# Patient Record
Sex: FEMALE | Race: OTHER
[De-identification: names, ages, dates, MRNs, and addresses within clinical notes are randomized per-mention and may not be internally consistent; named-entity substitution may affect disease eponyms.]

---

## 2019-11-02 ENCOUNTER — HOSPITAL ENCOUNTER (INPATIENT)
Dept: HOSPITAL 72 - EMR | Age: 44
LOS: 5 days | Discharge: HOME | DRG: 263 | End: 2019-11-07
Payer: MEDICAID

## 2019-11-02 VITALS — HEIGHT: 60 IN | WEIGHT: 237.8 LBS | BODY MASS INDEX: 46.68 KG/M2

## 2019-11-02 VITALS — SYSTOLIC BLOOD PRESSURE: 110 MMHG | DIASTOLIC BLOOD PRESSURE: 60 MMHG

## 2019-11-02 VITALS — SYSTOLIC BLOOD PRESSURE: 125 MMHG | DIASTOLIC BLOOD PRESSURE: 86 MMHG

## 2019-11-02 VITALS — SYSTOLIC BLOOD PRESSURE: 130 MMHG | DIASTOLIC BLOOD PRESSURE: 89 MMHG

## 2019-11-02 VITALS — DIASTOLIC BLOOD PRESSURE: 82 MMHG | SYSTOLIC BLOOD PRESSURE: 135 MMHG

## 2019-11-02 VITALS — DIASTOLIC BLOOD PRESSURE: 83 MMHG | SYSTOLIC BLOOD PRESSURE: 137 MMHG

## 2019-11-02 DIAGNOSIS — R07.89: ICD-10-CM

## 2019-11-02 DIAGNOSIS — Z90.12: ICD-10-CM

## 2019-11-02 DIAGNOSIS — E87.6: ICD-10-CM

## 2019-11-02 DIAGNOSIS — E66.9: ICD-10-CM

## 2019-11-02 DIAGNOSIS — K80.00: Primary | ICD-10-CM

## 2019-11-02 LAB
ADD MANUAL DIFF: NO
ALBUMIN SERPL-MCNC: 3.9 G/DL (ref 3.4–5)
ALBUMIN/GLOB SERPL: 1 {RATIO} (ref 1–2.7)
ALP SERPL-CCNC: 81 U/L (ref 46–116)
ALT SERPL-CCNC: 36 U/L (ref 12–78)
ANION GAP SERPL CALC-SCNC: 10 MMOL/L (ref 5–15)
APPEARANCE UR: CLEAR
APTT BLD: 28 SEC (ref 23–33)
APTT PPP: (no result) S
AST SERPL-CCNC: 23 U/L (ref 15–37)
BASOPHILS NFR BLD AUTO: 0.6 % (ref 0–2)
BILIRUB SERPL-MCNC: 0.3 MG/DL (ref 0.2–1)
BUN SERPL-MCNC: 9 MG/DL (ref 7–18)
CALCIUM SERPL-MCNC: 8.9 MG/DL (ref 8.5–10.1)
CHLORIDE SERPL-SCNC: 103 MMOL/L (ref 98–107)
CO2 SERPL-SCNC: 24 MMOL/L (ref 21–32)
CREAT SERPL-MCNC: 0.8 MG/DL (ref 0.55–1.3)
EOSINOPHIL NFR BLD AUTO: 0.1 % (ref 0–3)
ERYTHROCYTE [DISTWIDTH] IN BLOOD BY AUTOMATED COUNT: 11 % (ref 11.6–14.8)
GLOBULIN SER-MCNC: 3.9 G/DL
GLUCOSE UR STRIP-MCNC: NEGATIVE MG/DL
HCT VFR BLD CALC: 43.4 % (ref 37–47)
HGB BLD-MCNC: 14.4 G/DL (ref 12–16)
INR PPP: 0.9 (ref 0.9–1.1)
KETONES UR QL STRIP: (no result)
LEUKOCYTE ESTERASE UR QL STRIP: (no result)
LYMPHOCYTES NFR BLD AUTO: 17.2 % (ref 20–45)
MCV RBC AUTO: 85 FL (ref 80–99)
MONOCYTES NFR BLD AUTO: 2.4 % (ref 1–10)
NEUTROPHILS NFR BLD AUTO: 79.7 % (ref 45–75)
NITRITE UR QL STRIP: NEGATIVE
PH UR STRIP: 5 [PH] (ref 4.5–8)
PLATELET # BLD: 278 K/UL (ref 150–450)
POTASSIUM SERPL-SCNC: 3.5 MMOL/L (ref 3.5–5.1)
PROT UR QL STRIP: (no result)
RBC # BLD AUTO: 5.1 M/UL (ref 4.2–5.4)
SODIUM SERPL-SCNC: 137 MMOL/L (ref 136–145)
SP GR UR STRIP: 1.02 (ref 1–1.03)
UROBILINOGEN UR-MCNC: NORMAL MG/DL (ref 0–1)
WBC # BLD AUTO: 9.9 K/UL (ref 4.8–10.8)

## 2019-11-02 PROCEDURE — 83690 ASSAY OF LIPASE: CPT

## 2019-11-02 PROCEDURE — 80307 DRUG TEST PRSMV CHEM ANLYZR: CPT

## 2019-11-02 PROCEDURE — 81025 URINE PREGNANCY TEST: CPT

## 2019-11-02 PROCEDURE — 85610 PROTHROMBIN TIME: CPT

## 2019-11-02 PROCEDURE — 78266 GSTR EMPT IMG SM BWL&COLON: CPT

## 2019-11-02 PROCEDURE — 36415 COLL VENOUS BLD VENIPUNCTURE: CPT

## 2019-11-02 PROCEDURE — 86140 C-REACTIVE PROTEIN: CPT

## 2019-11-02 PROCEDURE — 99285 EMERGENCY DEPT VISIT HI MDM: CPT

## 2019-11-02 PROCEDURE — 85730 THROMBOPLASTIN TIME PARTIAL: CPT

## 2019-11-02 PROCEDURE — 87040 BLOOD CULTURE FOR BACTERIA: CPT

## 2019-11-02 PROCEDURE — 84484 ASSAY OF TROPONIN QUANT: CPT

## 2019-11-02 PROCEDURE — 96366 THER/PROPH/DIAG IV INF ADDON: CPT

## 2019-11-02 PROCEDURE — 80048 BASIC METABOLIC PNL TOTAL CA: CPT

## 2019-11-02 PROCEDURE — 96365 THER/PROPH/DIAG IV INF INIT: CPT

## 2019-11-02 PROCEDURE — 85651 RBC SED RATE NONAUTOMATED: CPT

## 2019-11-02 PROCEDURE — 80053 COMPREHEN METABOLIC PANEL: CPT

## 2019-11-02 PROCEDURE — 94003 VENT MGMT INPAT SUBQ DAY: CPT

## 2019-11-02 PROCEDURE — 85025 COMPLETE CBC W/AUTO DIFF WBC: CPT

## 2019-11-02 PROCEDURE — 76700 US EXAM ABDOM COMPLETE: CPT

## 2019-11-02 PROCEDURE — 96376 TX/PRO/DX INJ SAME DRUG ADON: CPT

## 2019-11-02 PROCEDURE — 94150 VITAL CAPACITY TEST: CPT

## 2019-11-02 PROCEDURE — 81003 URINALYSIS AUTO W/O SCOPE: CPT

## 2019-11-02 PROCEDURE — 96375 TX/PRO/DX INJ NEW DRUG ADDON: CPT

## 2019-11-02 PROCEDURE — 82150 ASSAY OF AMYLASE: CPT

## 2019-11-02 RX ADMIN — DOCUSATE SODIUM SCH MG: 100 CAPSULE, LIQUID FILLED ORAL at 21:00

## 2019-11-02 RX ADMIN — SODIUM CHLORIDE SCH MLS/HR: 0.9 INJECTION INTRAVENOUS at 17:59

## 2019-11-02 RX ADMIN — MORPHINE SULFATE PRN MG: 2 INJECTION, SOLUTION INTRAMUSCULAR; INTRAVENOUS at 17:59

## 2019-11-02 NOTE — DIAGNOSTIC IMAGING REPORT
EXAM:

  US Abdomen Complete

 

CLINICAL HISTORY:

  PAIN

 

TECHNIQUE:

  Real-time ultrasound of the abdomen (complete) with image documentation.

 

COMPARISON:

  No relevant prior studies available.

 

FINDINGS:

  Liver:  Liver diameter 17.82 cm. Diffusely increased echotexture.  No 

visible parenchymal lesions.  No intrahepatic biliary ductal dilatation.

  Gallbladder: Cholelithiasis with stones measuring up to 2.7 cm, and a 

stone lodged in the gallbladder neck.  No wall thickening.  No 

pericholecystic fluid.

  Common bile duct:  Common bile duct diameter 0.47 mm, within normal 

limits.

  Pancreas:  Unremarkable as visualized. Pancreatic body and tail are 

obscured by bowel gas.

  Kidneys:  Right kidney length of 11.52 cm.  Left kidney length of 10.95 

cm. Normal cortical thickness.  No visible stones.  No hydronephrosis.  1.

2 cm simple-appearing cyst in the left lower renal pole.

  Spleen:  Spleen length of 8.36 cm, within normal limits.

  Aorta:  Unremarkable.  Visualized portions appear unremarkable without 

evidence of aneurysm.

  Inferior vena cava:  Unremarkable.

 

IMPRESSION:   

1.  Cholelithiasis with stones measuring up to 2.7 cm and the stone 

lodged in the gallbladder neck.  No gallbladder wall thickening or 

pericholecystic fluid.  No blurry ductal dilatation.

2.  1.2 cm simple-appearing cortical cyst in the left lower renal pole.

3.  Diffusely echogenic liver, suggesting fatty infiltration.

## 2019-11-02 NOTE — NUR
ED Nurse Note:

Patient walked into ED c/o upper abdominal pain that started earlier this 
morning at around 0300, patient reports a sudden acute pain that woke her up, 
describes it as epigastric with a rate of 10/10 pain. denies any episodes of 
vomit however states that she is nauseous, IV started on left AC 20 gauge, 
placed on a cardiac monitor. labs sent down. will continue to monitor

## 2019-11-02 NOTE — HISTORY AND PHYSICAL
History of Present Illness


General


Date patient seen:  Nov 2, 2019


Reason for Hospitalization:  Abdominal Pain





Present Illness


HPI


This is a 44-year-old female with a past medical history of obesity presenting 

with right upper quadrant, constant, sharp, 8 out of 10, radiating to her back 

abdominal pain since 3 AM this morning.  The patient had a similar episode 2 

years ago and it resolved on its own.  She has some nausea but no vomiting.  

Denies any fever or chills chest pain shortness of breath cough.





In the ER the patient had a temperature 97.5 pulse 80 respirations 18 pressure 

125/86 saturating 100% on room air.  WBCs 9.9 lipase 222 CMP and CBC normal.  

INR 0.9 UA negative, UDS negative.  Right upper quadrant ultrasound showed 

cholelithiasis





Allergies: None





Medications B12





Past medical history none





Surgical history left breast mass excision





Family history mother breast cancer at 85


Allergies:  


Coded Allergies:  


     No Known Allergies (Unverified , 11/2/19)





Medication History


No Active Prescriptions or Reported Meds





Patient History


Healthcare decision maker





Resuscitation status


Full Code


Advanced Directive on File








Review of Systems


Constitutional:  Denies: see HPI, chills, sweats, fever, malaise, weakness, 

other


Eye:  Denies: see HPI, eye pain, blurred vision, tearing, double vision, nose 

pain, nose congestion, acuity changes, discharge, other


ENT:  Denies: see HPI, ear pain, ear discharge, nose pain, nose congestion, 

throat pain, throat swelling, mouth pain, hearing loss, nasal discharge, other


Respiratory:  Denies: see HPI, cough, orthopnea, shortness of breath, stridor, 

wheezing, AQUINO, sputum, other


Cardiovascular:  Denies: see HPI, chest pain, edema, palpitations, syncope, PND

, other


Gastrointestinal:  Reports: abdominal pain, nausea; Denies: see HPI, 

constipation, diarrhea, vomiting, melena, hematemesis, other


Genitourinary:  Denies: see HPI, discharge, dysuria, frequency, hematuria, pain

, retention, incontinence, urgency, vag bleed/dc, other


Musculoskeletal:  Denies: see HPI, back pain, gout, joint pain, joint swelling, 

muscle pain, muscle stiffness, other


Skin:  Denies: see HPI, rash, change in color, change in hair/nails, dryness, 

lesions, other


Psychiatric:  Denies: see HPI, prior hx, anxiety, depressed feelings, emotional 

problems, SI, HI, hallucinations, other


Neurological:  Denies: see HPI, headache, numbness, paresthesia, seizure, 

tingling, tremors, focal weakness, syncope, dizziness, other


Endocrine:  Denies: see HPI, excessive sweating, flushing, intolerance to 

temperature, increased thirst, increased urine, unexplained weight loss, other


Hematologic/Lymphatic:  Denies: see HPI, anemia, blood clots, easy bleeding, 

easy bruising, swollen glands, diathesis, other





Physical Exam


General Appearance:  WD/WN, no apparent distress, alert


Lines, tubes and drains:  peripheral


HEENT:  normocephalic, atraumatic


Neck:  non-tender, normal alignment, supple


Respiratory/Chest:  chest wall non-tender, lungs clear, normal breath sounds


Cardiovascular/Chest:  normal peripheral pulses, normal rate, regular rhythm, 

no JVD


Abdomen:  other - Soft, nondistended, bowel sounds present, tenderness to 

palpation of epigastric and right upper quadrant with positive Tesfaye sign


Extremities:  normal range of motion, non-tender


Skin Exam:  normal pigmentation, warm/dry


Neurologic:  CNs II-XII grossly normal, no motor/sensory deficits, alert, 

oriented x 3


Lymphatic:  anterior cervical - No lymphadenopathy





Last 24 Hour Vital Signs








  Date Time  Temp Pulse Resp B/P (MAP) Pulse Ox O2 Delivery O2 Flow Rate FiO2


 


11/2/19 18:29 98.1       


 


11/2/19 16:13      Room Air  


 


11/2/19 16:00 98.5 83 20 127/70 98 Room Air  


 


11/2/19 16:00 98.1 88 18 130/89 (103) 98   


 


11/2/19 15:40 98.2 83 18 110/60 100 Room Air  


 


11/2/19 13:25 97.5 86 18 135/82 100 Room Air  


 


11/2/19 11:05 97.5       


 


11/2/19 10:20  80 18   Room Air  


 


11/2/19 10:20 97.5 86 18 125/86 100 Room Air  


 


11/2/19 10:15 97.5 80 18 125/86 (99) 100 Room Air  











Laboratory Tests








Test


  11/2/19


10:20


 


White Blood Count


  9.9 K/UL


(4.8-10.8)


 


Red Blood Count


  5.10 M/UL


(4.20-5.40)


 


Hemoglobin


  14.4 G/DL


(12.0-16.0)


 


Hematocrit


  43.4 %


(37.0-47.0)


 


Mean Corpuscular Volume 85 FL (80-99)  


 


Mean Corpuscular Hemoglobin


  28.2 PG


(27.0-31.0)


 


Mean Corpuscular Hemoglobin


Concent 33.1 G/DL


(32.0-36.0)


 


Red Cell Distribution Width


  11.0 %


(11.6-14.8)  L


 


Platelet Count


  278 K/UL


(150-450)


 


Mean Platelet Volume


  6.8 FL


(6.5-10.1)


 


Neutrophils (%) (Auto)


  79.7 %


(45.0-75.0)  H


 


Lymphocytes (%) (Auto)


  17.2 %


(20.0-45.0)  L


 


Monocytes (%) (Auto)


  2.4 %


(1.0-10.0)


 


Eosinophils (%) (Auto)


  0.1 %


(0.0-3.0)


 


Basophils (%) (Auto)


  0.6 %


(0.0-2.0)


 


Prothrombin Time


  9.5 SEC


(9.30-11.50)


 


Prothromb Time International


Ratio 0.9 (0.9-1.1)  


 


 


Activated Partial


Thromboplast Time 28 SEC (23-33)


 


 


Urine Color Pale yellow  


 


Urine Appearance Clear  


 


Urine pH 5 (4.5-8.0)  


 


Urine Specific Gravity


  1.020


(1.005-1.035)


 


Urine Protein


  1+ (NEGATIVE)


H


 


Urine Glucose (UA)


  Negative


(NEGATIVE)


 


Urine Ketones


  2+ (NEGATIVE)


H


 


Urine Blood


  2+ (NEGATIVE)


H


 


Urine Nitrite


  Negative


(NEGATIVE)


 


Urine Bilirubin


  Negative


(NEGATIVE)


 


Urine Urobilinogen


  Normal MG/DL


(0.0-1.0)


 


Urine Leukocyte Esterase


  2+ (NEGATIVE)


H


 


Urine RBC


  0-2 /HPF (0 -


2)


 


Urine WBC


  2-4 /HPF (0 -


2)


 


Urine Squamous Epithelial


Cells Moderate /LPF


(NONE/OCC)  H


 


Urine Bacteria


  Few /HPF


(NONE)


 


Urine Mucus


  Few /LPF


(NONE/OCC)  H


 


Urine HCG, Qualitative


  Negative


(NEGATIVE)


 


Sodium Level


  137 MMOL/L


(136-145)


 


Potassium Level


  3.5 MMOL/L


(3.5-5.1)


 


Chloride Level


  103 MMOL/L


()


 


Carbon Dioxide Level


  24 MMOL/L


(21-32)


 


Anion Gap


  10 mmol/L


(5-15)


 


Blood Urea Nitrogen


  9 mg/dL (7-18)


 


 


Creatinine


  0.8 MG/DL


(0.55-1.30)


 


Estimat Glomerular Filtration


Rate > 60 mL/min


(>60)


 


Glucose Level


  122 MG/DL


()  H


 


Calcium Level


  8.9 MG/DL


(8.5-10.1)


 


Total Bilirubin


  0.3 MG/DL


(0.2-1.0)


 


Aspartate Amino Transf


(AST/SGOT) 23 U/L (15-37)


 


 


Alanine Aminotransferase


(ALT/SGPT) 36 U/L (12-78)


 


 


Alkaline Phosphatase


  81 U/L


()


 


Total Protein


  7.8 G/DL


(6.4-8.2)


 


Albumin


  3.9 G/DL


(3.4-5.0)


 


Globulin 3.9 g/dL  


 


Albumin/Globulin Ratio 1.0 (1.0-2.7)  


 


Lipase


  222 U/L


()


 


Urine Opiates Screen


  Negative


(NEGATIVE)


 


Urine Barbiturates Screen


  Negative


(NEGATIVE)


 


Phencyclidine (PCP) Screen


  Negative


(NEGATIVE)


 


Urine Amphetamines Screen


  Negative


(NEGATIVE)


 


Urine Benzodiazepines Screen


  Negative


(NEGATIVE)


 


Urine Cocaine Screen


  Negative


(NEGATIVE)


 


Urine Marijuana (THC) Screen


  Negative


(NEGATIVE)








Height (Feet):  5


Height (Inches):  1.00


Weight (Pounds):  224


Medications





Current Medications








 Medications


  (Trade)  Dose


 Ordered  Sig/Danita


 Route


 PRN Reason  Start Time


 Stop Time Status Last Admin


Dose Admin


 


 Acetaminophen


  (Tylenol)  650 mg  Q4H  PRN


 ORAL


 fever (temp>100.4F)  11/2/19 15:15


 12/2/19 15:14   


 


 


 Al Hydroxide/Mg


 Hydroxide


  (Mylanta II)  30 ml  Q6H  PRN


 ORAL


 dyspepsia  11/2/19 15:15


 12/2/19 15:14   


 


 


 Bisacodyl


  (Dulcolax)  10 mg  HSPRN  PRN


 RECTAL


 Constipation  11/2/19 15:15


 12/2/19 15:14   


 


 


 Ceftriaxone


 Sodium 1 gm/


 Sodium Chloride  55 ml @ 


 110 mls/hr  DAILY


 IVPB


   11/2/19 18:00


 11/9/19 17:59  11/2/19 17:59


 


 


 Dextrose


  (Dextrose 50%)  25 ml  Q30M  PRN


 IV


 Hypoglycemia  11/2/19 15:15


 12/2/19 15:14   


 


 


 Dextrose


  (Dextrose 50%)  50 ml  Q30M  PRN


 IV


 Hypoglycemia  11/2/19 15:15


 12/2/19 15:14   


 


 


 Diphenhydramine


 HCl


  (Benadryl)  25 mg  Q6H  PRN


 ORAL


 Itching/Pruritis  11/2/19 15:15


 12/2/19 15:14   


 


 


 Docusate Sodium


  (Colace)  100 mg  EVERY 12  HOURS


 ORAL


   11/2/19 21:00


 12/2/19 20:59   


 


 


 Famotidine


  (Pepcid)  40 mg  DAILY


 ORAL


   11/3/19 09:00


 12/3/19 08:59   


 


 


 Lorazepam


  (Ativan)  1 mg  Q4H  PRN


 ORAL


 For Anxiety  11/2/19 15:15


 11/9/19 15:14   


 


 


 Magnesium


 Hydroxide


  (Mom)  30 ml  HSPRN  PRN


 ORAL


 Constipation  11/2/19 15:15


 12/2/19 15:14   


 


 


 Metronidazole  100 ml @ 


 100 mls/hr  Q8HR


 IVPB


   11/2/19 20:00


 11/9/19 19:59   


 


 


 Morphine Sulfate


  (Morphine


 Sulfate)  1 mg  Q3H  PRN


 IVP


 Breakthrough Pain  11/2/19 15:15


 11/9/19 15:14   


 


 


 Morphine Sulfate


  (Morphine


 Sulfate)  2 mg  Q3H  PRN


 IVP


 Moderate Pain (Pain Scale 4-6)  11/2/19 15:15


 11/9/19 15:14  11/2/19 17:59


 


 


 Morphine Sulfate


  (Morphine


 Sulfate)  4 mg  Q4H  PRN


 IVP


 Severe Pain (Pain Scale 7-10)  11/2/19 20:30


 11/9/19 20:29   


 


 


 Ondansetron HCl


  (Zofran)  4 mg  Q6H  PRN


 IVP


 Nausea & Vomiting  11/2/19 15:15


 12/2/19 15:14   


 


 


 Sodium Chloride  1,000 ml @ 


 100 mls/hr  Q10H


 IV


   11/2/19 18:45


 12/2/19 18:44  11/2/19 18:45


 


 


 Temazepam


  (Restoril)  15 mg  HSPRN  PRN


 ORAL


 Insomnia  11/2/19 21:00


 11/9/19 20:59   


 











Assessment/Plan


Problem List:  


(1) Abdominal pain


ICD Codes:  R10.9 - Unspecified abdominal pain


SNOMED:  27959877


(2) Obesity


ICD Codes:  E66.9 - Obesity, unspecified


SNOMED:  076115663, 649866498


(3) History of partial mastectomy of left breast


ICD Codes:  Z90.12 - Acquired absence of left breast and nipple


SNOMED:  04168984, [id number]


(4) Symptomatic cholelithiasis


ICD Codes:  K80.20 - Calculus of gallbladder without cholecystitis without 

obstruction


SNOMED:  729724589, [id number]


Assessment/Plan:


44-year-old female with a history of obesity presenting with symptomatic 

cholelithiasis.





#Symptomatic cholelithiasis.  Rule out developing cholecystitis


#Obesity


#Abdominal pain


-Admit to [facility]


-Appreciate general surgery recommendations: Dr. Randall


-Start Rocephin 1 g IV daily (11/2/19 - )


-Start metronidazole 500 mg every 8 hours (11/2/19 - )


-Tylenol and morphine as needed for pain


-Zofran as needed for nausea


-Sodium chloride at 100 cc/h


-Counseled patient on weight loss





FENPPX





DVTPPX: SCDs, as patient may require surgery


GI PPX: pepcid


Fluids: as above


Diet: NPO except meds


Lines: peripheral


PT/OT: none needd


Code status: Full





Dispo: Home


Reason for Continued Hospitalization: Abdominal pain





73 inutes spent on this encounter. Discussed with RN, Patient, ED staff, and 

general surgery  . > 50% spent on counseling and care coordination. 





Time of note may not reflect time patient was seen.











Charlie Blanca D.O. Nov 2, 2019 21:07

## 2019-11-02 NOTE — NUR
NURSE NOTES:

Pt is in bed AOx4, verbally able to needs known, [place] citizen speaking. Breathing on room air. No 
acute distress noted.  IV on L AC 20g is patent. Called Dr Randall to increase the dosage 
of pain medicine due to C/O current dosage is not effective. Order received and carried out. 
Bed in low and locked position. Call light within reach. Will continue to monitor the pt for 
safety.

## 2019-11-02 NOTE — EMERGENCY ROOM REPORT
History of Present Illness


General


Chief Complaint:  Abdominal Pain


Source:  Patient





Present Illness


HPI


Patient is a 44-year-old female presented after acute onset of epigastric pain.

  This radiates to her back.  Patient reports having onset of symptoms 

approximately 3:00 this morning.  She denies any recent alcohol use.  She 

denies prior history of gallstones.  Denies similar episodes of pain in the 

past.  Had not been vomiting but had increased nausea.  She denies any black or 

bloody stools.  Denies any vomiting.  Pain is constant nature.  Patient denies 

being pregnant.  Denies prior surgeries.  Denies history of diabetes or ulcer 

disease.


Allergies:  


Coded Allergies:  


     No Known Allergies (Unverified , 19)





Patient History


Past Medical History:  see triage record


Last Menstrual Period:  10/21/2019


Pregnant Now:  No


:  1


Para:  1


Reviewed Nursing Documentation:  PMH: Agreed; PSxH: Agreed





Nursing Documentation-PM


Past Medical History:  No Stated History





Review of Systems


All Other Systems:  negative except mentioned in HPI





Physical Exam





Vital Signs








  Date Time  Temp Pulse Resp B/P (MAP) Pulse Ox O2 Delivery O2 Flow Rate FiO2


 


19 10:15 97.5 80 18 125/86 (99) 100 Room Air  








Sp02 EP Interpretation:  reviewed, normal


General Appearance:  normal inspection, well appearing, no apparent distress, 

alert, GCS 15


Head:  atraumatic


ENT:  normal ENT inspection, hearing grossly normal, normal voice


Neck:  normal inspection, full range of motion, supple, no bony tend


Respiratory:  normal inspection, lungs clear, normal breath sounds, no 

respiratory distress, no retraction, no wheezing


Cardiovascular #1:  regular rate, rhythm, no edema


Gastrointestinal:  normal inspection, normal bowel sounds, non tender, soft, no 

guarding, no hernia


Genitourinary:  no CVA tenderness


Musculoskeletal:  normal inspection, back normal, normal range of motion


Neurologic:  normal inspection, alert, oriented x3, responsive, speech normal


Psychiatric:  normal inspection, judgement/insight normal, mood/affect normal





Medical Decision Making


Diagnostic Impression:  


 Primary Impression:  


 Symptomatic cholelithiasis


ER Course


Patient presented for abdominal pain.   Differential diagnoses pancreatitis, 

included ischemic bowel, appendicitis, perforated viscus, abdominal aortic 

aneurysm, inferior myocardial infarction, viral gastroenteritis among 

others.Because patient's complexity imaging studies,  and laboratory testing 

ordered.


Laboratory testing showed .  Electrolytes normal


Lipase was normal


White blood count was normal White blood count


Abdominal ultrasound showed large gallstone in the gallbladder neck without any 

evidence of pericolic fluid or wall thickening.  Patient was given morphine for 

pain.  She continued to have pain and was also given additional dose of Toradol.


Patient was given additional dose of morphine as well.  Patient continued to 

have pain which is approximately 8 hours after pains onset.Dr. Ramirez was 

contacted for Dr. Woods for  inpatient management. Dr. Randall was 

contacted for surgical consult.





Labs








Test


  19


10:20


 


White Blood Count


  9.9 K/UL


(4.8-10.8)


 


Red Blood Count


  5.10 M/UL


(4.20-5.40)


 


Hemoglobin


  14.4 G/DL


(12.0-16.0)


 


Hematocrit


  43.4 %


(37.0-47.0)


 


Mean Corpuscular Volume 85 FL (80-99) 


 


Mean Corpuscular Hemoglobin


  28.2 PG


(27.0-31.0)


 


Mean Corpuscular Hemoglobin


Concent 33.1 G/DL


(32.0-36.0)


 


Red Cell Distribution Width


  11.0 %


(11.6-14.8)


 


Platelet Count


  278 K/UL


(150-450)


 


Mean Platelet Volume


  6.8 FL


(6.5-10.1)


 


Neutrophils (%) (Auto)


  79.7 %


(45.0-75.0)


 


Lymphocytes (%) (Auto)


  17.2 %


(20.0-45.0)


 


Monocytes (%) (Auto)


  2.4 %


(1.0-10.0)


 


Eosinophils (%) (Auto)


  0.1 %


(0.0-3.0)


 


Basophils (%) (Auto)


  0.6 %


(0.0-2.0)


 


Prothrombin Time


  9.5 SEC


(9.30-11.50)


 


Prothromb Time International


Ratio 0.9 (0.9-1.1) 


 


 


Activated Partial


Thromboplast Time 28 SEC (23-33) 


 


 


Urine Color Pale yellow 


 


Urine Appearance Clear 


 


Urine pH 5 (4.5-8.0) 


 


Urine Specific Gravity


  1.020


(1.005-1.035)


 


Urine Protein 1+ (NEGATIVE) 


 


Urine Glucose (UA)


  Negative


(NEGATIVE)


 


Urine Ketones 2+ (NEGATIVE) 


 


Urine Blood 2+ (NEGATIVE) 


 


Urine Nitrite


  Negative


(NEGATIVE)


 


Urine Bilirubin


  Negative


(NEGATIVE)


 


Urine Urobilinogen


  Normal MG/DL


(0.0-1.0)


 


Urine Leukocyte Esterase 2+ (NEGATIVE) 


 


Urine RBC


  0-2 /HPF (0 -


2)


 


Urine WBC


  2-4 /HPF (0 -


2)


 


Urine Squamous Epithelial


Cells Moderate /LPF


(NONE/OCC)


 


Urine Bacteria


  Few /HPF


(NONE)


 


Urine Mucus


  Few /LPF


(NONE/OCC)


 


Urine HCG, Qualitative


  Negative


(NEGATIVE)


 


Sodium Level


  137 MMOL/L


(136-145)


 


Potassium Level


  3.5 MMOL/L


(3.5-5.1)


 


Chloride Level


  103 MMOL/L


()


 


Carbon Dioxide Level


  24 MMOL/L


(21-32)


 


Anion Gap


  10 mmol/L


(5-15)


 


Blood Urea Nitrogen 9 mg/dL (7-18) 


 


Creatinine


  0.8 MG/DL


(0.55-1.30)


 


Estimat Glomerular Filtration


Rate > 60 mL/min


(>60)


 


Glucose Level


  122 MG/DL


()


 


Calcium Level


  8.9 MG/DL


(8.5-10.1)


 


Total Bilirubin


  0.3 MG/DL


(0.2-1.0)


 


Aspartate Amino Transf


(AST/SGOT) 23 U/L (15-37) 


 


 


Alanine Aminotransferase


(ALT/SGPT) 36 U/L (12-78) 


 


 


Alkaline Phosphatase


  81 U/L


()


 


Total Protein


  7.8 G/DL


(6.4-8.2)


 


Albumin


  3.9 G/DL


(3.4-5.0)


 


Globulin 3.9 g/dL 


 


Albumin/Globulin Ratio 1.0 (1.0-2.7) 


 


Lipase


  222 U/L


()


 


Urine Opiates Screen


  Negative


(NEGATIVE)


 


Urine Barbiturates Screen


  Negative


(NEGATIVE)


 


Phencyclidine (PCP) Screen


  Negative


(NEGATIVE)


 


Urine Amphetamines Screen


  Negative


(NEGATIVE)


 


Urine Benzodiazepines Screen


  Negative


(NEGATIVE)


 


Urine Cocaine Screen


  Negative


(NEGATIVE)


 


Urine Marijuana (THC) Screen


  Negative


(NEGATIVE)











Last Vital Signs








  Date Time  Temp Pulse Resp B/P (MAP) Pulse Ox O2 Delivery O2 Flow Rate FiO2


 


19 10:15 97.5 80 18 125/86 (99) 100 Room Air  








Status:  unchanged


Disposition:  ADMITTED AS INPATIENT


Condition:  Stable


Referrals:  


[facility],REFERRING (PCP)











Bereket Hussein MD 2019 10:30

## 2019-11-02 NOTE — NUR
NURSE NOTES:

Received patient awake, alert and oriented via gurney from the ED.  Oriented patient to 
room.  Belongings verified.  Bed at lowest level with 2 side rails up.  Call light within 
reach.  In no apparent distress at this time.  Will continue to monitor.

## 2019-11-03 VITALS — DIASTOLIC BLOOD PRESSURE: 88 MMHG | SYSTOLIC BLOOD PRESSURE: 148 MMHG

## 2019-11-03 VITALS — SYSTOLIC BLOOD PRESSURE: 148 MMHG | DIASTOLIC BLOOD PRESSURE: 87 MMHG

## 2019-11-03 VITALS — SYSTOLIC BLOOD PRESSURE: 150 MMHG | DIASTOLIC BLOOD PRESSURE: 93 MMHG

## 2019-11-03 VITALS — DIASTOLIC BLOOD PRESSURE: 91 MMHG | SYSTOLIC BLOOD PRESSURE: 148 MMHG

## 2019-11-03 VITALS — DIASTOLIC BLOOD PRESSURE: 74 MMHG | SYSTOLIC BLOOD PRESSURE: 135 MMHG

## 2019-11-03 VITALS — DIASTOLIC BLOOD PRESSURE: 80 MMHG | SYSTOLIC BLOOD PRESSURE: 134 MMHG

## 2019-11-03 LAB
ADD MANUAL DIFF: NO
ALBUMIN SERPL-MCNC: 3.2 G/DL (ref 3.4–5)
ALBUMIN/GLOB SERPL: 0.9 {RATIO} (ref 1–2.7)
ALP SERPL-CCNC: 73 U/L (ref 46–116)
ALT SERPL-CCNC: 30 U/L (ref 12–78)
AMYLASE SERPL-CCNC: 40 U/L (ref 25–115)
ANION GAP SERPL CALC-SCNC: 7 MMOL/L (ref 5–15)
AST SERPL-CCNC: 15 U/L (ref 15–37)
BASOPHILS NFR BLD AUTO: 0.3 % (ref 0–2)
BILIRUB SERPL-MCNC: 0.5 MG/DL (ref 0.2–1)
BUN SERPL-MCNC: 5 MG/DL (ref 7–18)
CALCIUM SERPL-MCNC: 8 MG/DL (ref 8.5–10.1)
CHLORIDE SERPL-SCNC: 103 MMOL/L (ref 98–107)
CO2 SERPL-SCNC: 27 MMOL/L (ref 21–32)
CREAT SERPL-MCNC: 0.7 MG/DL (ref 0.55–1.3)
EOSINOPHIL NFR BLD AUTO: 0.1 % (ref 0–3)
ERYTHROCYTE [DISTWIDTH] IN BLOOD BY AUTOMATED COUNT: 11 % (ref 11.6–14.8)
GLOBULIN SER-MCNC: 3.7 G/DL
HCT VFR BLD CALC: 39.6 % (ref 37–47)
HGB BLD-MCNC: 13.1 G/DL (ref 12–16)
LYMPHOCYTES NFR BLD AUTO: 14.8 % (ref 20–45)
MCV RBC AUTO: 86 FL (ref 80–99)
MONOCYTES NFR BLD AUTO: 5.7 % (ref 1–10)
NEUTROPHILS NFR BLD AUTO: 79.1 % (ref 45–75)
PLATELET # BLD: 254 K/UL (ref 150–450)
POTASSIUM SERPL-SCNC: 3.5 MMOL/L (ref 3.5–5.1)
RBC # BLD AUTO: 4.6 M/UL (ref 4.2–5.4)
SODIUM SERPL-SCNC: 137 MMOL/L (ref 136–145)
WBC # BLD AUTO: 11.4 K/UL (ref 4.8–10.8)

## 2019-11-03 RX ADMIN — DOCUSATE SODIUM SCH MG: 100 CAPSULE, LIQUID FILLED ORAL at 21:50

## 2019-11-03 RX ADMIN — MORPHINE SULFATE PRN MG: 2 INJECTION, SOLUTION INTRAMUSCULAR; INTRAVENOUS at 10:08

## 2019-11-03 RX ADMIN — SODIUM CHLORIDE SCH MLS/HR: 0.9 INJECTION INTRAVENOUS at 08:27

## 2019-11-03 RX ADMIN — DOCUSATE SODIUM SCH MG: 100 CAPSULE, LIQUID FILLED ORAL at 08:27

## 2019-11-03 RX ADMIN — MORPHINE SULFATE PRN MG: 2 INJECTION, SOLUTION INTRAMUSCULAR; INTRAVENOUS at 04:42

## 2019-11-03 NOTE — NUR
NURSE NOTES:

patient is anxious. c/o chest tightness when she breathes in. O2 sat 98% on room air. pain 
8/10 on abd area. patient said the [id number]/1ml only works for hour but she does not want 
to get 4mg/2ml since she is NPO, not want to feel  to drowsy. RN provided hot pack for 
relieving pain and helped to feel comfort. will continue to monitor.

## 2019-11-03 NOTE — NUR
NURSE NOTES:

Pt is in bed asleep. Evening meds administered as ordered and assisted as needed. Bed in low 
and locked position. Call light within reach. Will continue to monitor the pt.

## 2019-11-03 NOTE — GENERAL PROGRESS NOTE
Assessment/Plan


Problem List:  


(1) Acute cholecystitis


ICD Codes:  K81.0 - Acute cholecystitis


SNOMED:  09336576


(2) Abdominal pain


ICD Codes:  R10.9 - Unspecified abdominal pain


SNOMED:  74177509


(3) Obesity


ICD Codes:  E66.9 - Obesity, unspecified


SNOMED:  454534777, 696651082


(4) History of partial mastectomy of left breast


ICD Codes:  Z90.12 - Acquired absence of left breast and nipple


SNOMED:  08156000, 076429495


(5) Symptomatic cholelithiasis


ICD Codes:  K80.20 - Calculus of gallbladder without cholecystitis without 

obstruction


SNOMED:  166583032, 432780095


Assessment/Plan:


44-year-old female with a history of obesity presenting with symptomatic 

cholelithiasis.





#Acute cholecystitis


#Abdominal pain


> HIDA positive


-Admit to [facility]


-Appreciate general surgery recommendations: Dr. Randall. NPO for surgery 

hopefully tomorrow 11/4/19


-Continue Rocephin 1 g IV daily (11/2/19 - )


-Continue metronidazole 500 mg every 8 hours (11/2/19 - )


-blood cultures x 2


-Tylenol and morphine as needed for pain


-Zofran as needed for nausea


-Sodium chloride at 100 cc/h





#Chest pressure on 11/3/19.  Suspect related to anxiety.


-EKG


-Troponin


-transfer to telemetry





#Obesity


-Counseled patient on weight loss





FENPPX





DVTPPX: SCDs,


GI PPX: pepcid


Fluids: as above


Diet: NPO except meds


Lines: peripheral


PT/OT: none needd


Code status: Full





Dispo: Home after surgery


Reason for Continued Hospitalization: Abdominal pain





39 minutes spent on this encounter. Discussed with RN, Patient, and general 

surgery. > 50% spent on counseling and care coordination. 





Time of note may not reflect time patient was seen.





Subjective


Date patient seen:  Nov 3, 2019


Constitutional:  Denies: chills, diaphoresis, fever, malaise, weakness, other


HEENT:  Denies: eye pain, blurred vision, tearing, double vision, ear pain, ear 

discharge, nose pain, nose congestion, throat pain, throat swelling, mouth pain

, mouth swelling, other


Cardiovascular:  Denies: chest pain, edema, irregular heart rate, 

lightheadedness, palpitations, syncope, other


Respiratory:  Denies: cough, orthopnea, shortness of breath, SOB with excertion

, SOB at rest, sputum, stridor, wheezing, other


Gastrointestinal/Abdominal:  Denies: abdomen distended, abdominal pain, black 

stools, tarry stools, blood in stool, constipated, diarrhea, difficulty 

swallowing, nausea, poor appetite, poor fluid intake, rectal bleeding, vomiting

, other


Genitourinary:  Denies: burning, discharge, frequency, flank pain, hematuria, 

incontinence, pain, urgency, other


Neurologic/Psychiatric:  Denies: anxiety, depressed, emotional problems, 

headache, numbness, paresthesia, pre-existing deficit, seizure, tingling, 

tremors, weakness, other


Endocrine:  Denies: excessive sweating, flushing, intolerance to cold, 

intolerance to heat, increased hunger, increased thirst, increased urine, 

unexplained weight gain, unexplained weight loss, other


Hematologic/Lymphatic:  Denies: anemia, easy bleeding, easy bruising, other


Allergies:  


Coded Allergies:  


     No Known Allergies (Unverified , 11/2/19)


Subjective


No acute events overnight per nursing.  This morning the patient had chest 

pressure, lasting 10 minutes, nonradiating, across her entire chest, 5 out of 

10 with associated feelings of anxiety and shortness of breath as well as 

diaphoresis.  Patient given Ativan and symptoms resolved.  He continues to have 

right upper quadrant abdominal pain.  HIDA scan positive for cholecystitis.  

Denies nausea or vomiting or fevers or chills.





Objective





Last 24 Hour Vital Signs








  Date Time  Temp Pulse Resp B/P (MAP) Pulse Ox O2 Delivery O2 Flow Rate FiO2


 


11/3/19 12:00 100.0 106 19 135/74 (94) 98   


 


11/3/19 09:00      Room Air  


 


11/3/19 08:00 98.4 104 20 150/93 (112) 99   


 


11/3/19 04:00 99.2 90 18 148/88 (108) 100   


 


11/3/19 00:00 98.8 84 18 134/80 (98) 100   


 


11/2/19 21:00      Room Air  


 


11/2/19 20:00 98.4 88 18 137/83 (101) 99   


 


11/2/19 18:29 98.1       


 


11/2/19 16:13      Room Air  


 


11/2/19 16:00 98.5 83 20 127/70 98 Room Air  


 


11/2/19 16:00 98.1 88 18 130/89 (103) 98   

















Intake and Output  


 


 11/2/19 11/3/19





 19:00 07:00


 


Intake Total 1370 ml 1000 ml


 


Balance 1370 ml 1000 ml


 


  


 


Intake IV Total 1370 ml 1000 ml


 


# Voids 3 2








Laboratory Tests


11/3/19 06:32: 


White Blood Count 11.4H, Red Blood Count 4.60, Hemoglobin 13.1, Hematocrit 39.6

, Mean Corpuscular Volume 86, Mean Corpuscular Hemoglobin 28.6, Mean 

Corpuscular Hemoglobin Concent 33.2, Red Cell Distribution Width 11.0L, 

Platelet Count 254, Mean Platelet Volume 6.4L, Neutrophils (%) (Auto) 79.1H, 

Lymphocytes (%) (Auto) 14.8L, Monocytes (%) (Auto) 5.7, Eosinophils (%) (Auto) 

0.1, Basophils (%) (Auto) 0.3, Erythrocyte Sedimentation Rate 11, Sodium Level 

137, Potassium Level 3.5, Chloride Level 103, Carbon Dioxide Level 27, Anion 

Gap 7, Blood Urea Nitrogen 5L, Creatinine 0.7, Estimat Glomerular Filtration 

Rate > 60, Glucose Level 117H, Calcium Level 8.0L, Total Bilirubin 0.5, 

Aspartate Amino Transf (AST/SGOT) 15, Alanine Aminotransferase (ALT/SGPT) 30, 

Alkaline Phosphatase 73, C-Reactive Protein, Quantitative 6.5H, Total Protein 

6.9, Albumin 3.2L, Globulin 3.7, Albumin/Globulin Ratio 0.9L, Amylase Level 40


Height (Feet):  5


Height (Inches):  1.00


Weight (Pounds):  224


General Appearance:  WD/WN, no apparent distress, alert


EENT:  PERRL/EOMI, normal ENT inspection


Neck:  non-tender, normal alignment, supple


Cardiovascular:  normal peripheral pulses, normal rate, regular rhythm, no JVD


Respiratory/Chest:  chest wall non-tender, lungs clear, normal breath sounds


Abdomen:  normal bowel sounds, other - Right upper quadrant tenderness to 

palpation. +grimm's sign


Extremities:  normal range of motion, non-tender, normal inspection


Edema:  other - No lower extremity edema bilaterally


Neurologic:  CNs II-XII grossly normal, no motor/sensory deficits, alert, 

oriented x 3


Skin:  normal pigmentation, warm/dry











Vikram-Wollin,Charlie D.O. Nov 3, 2019 15:48

## 2019-11-03 NOTE — CONSULTATION
History of Present Illness


General


Date patient seen:  Nov 3, 2019


Reason for Hospitalization:  Abdominal Pain





Present Illness


HPI


This is a very pleasant 44-year-old female with no significant medical 

committees other than obesity who presented to the emergency department [facility] complaining of acute onset of right upper quadrant abdominal 

pain with associated nausea.  Patient states proximal 5 months ago she had a 

similar episode which resolved with home remedy tea.  During this episode it 

did not improve suddenly so she came in for evaluation.  In ED identified to 

have right upper quadrant tenderness positive Tesfaye's.  Admitted for care and 

management.  Surgery called to evaluate and assist with care.  Patient seen, 

patient [place], chart reviewed.  States nausea but no emesis.  Normal bowel 

movements.  Pain is been intermittent since onset yesterday.  Better with pain 

medications but notable when palpated or pain medications were off.  Pain 

cramping 10 out of 10 at max.  Currently 6 out of 10.  Today noted to have a 

increasing leukocytosis.  HIDA scan positive.


Allergies:  


Coded Allergies:  


     No Known Allergies (Unverified , 11/2/19)





Medication History


No Active Prescriptions or Reported Meds





Patient History


History Provided By:  Patient, Medical Record, PMD


Healthcare decision maker





Resuscitation status


Full Code


Advanced Directive on File








Past Medical/Surgical History


Past Medical/Surgical History:  


(1) Abdominal pain


(2) Obesity


(3) Symptomatic cholelithiasis





Review of Systems


Review of Symptoms


General ROS: no weight loss or fever


Psychological ROS: no depression or mood changes, no memory loss


Ophthalmic ROS: no visual changes or eye irritation


ENT ROS: no nasal congestion, hearing loss, dizziness


Allergy and Immunology ROS: no allergic symptoms or urticaria


Hematological and Lymphatic ROS: no swollen glands, unusual bleeding or bruising


Endocrine ROS: no polyuria, polydipsia, weight changes, temperature intolerance


Respiratory ROS: no cough, shortness of breath, or wheezing


Cardiovascular ROS: no chest pain or dyspnea on exertion


Gastrointestinal ROS:  abdominal pain, no bright red blood in stool.


Musculoskeletal ROS: no myalgias or arthralgias


Neurological ROS: no TIA or stroke symptoms


Dermatological ROS: no new or changing skin lesions, rashes or pruritis





Physical Exam


Physical Exam


General appearance:  alert, cooperative, no distress, appears stated age


Head:  Normocephalic, without obvious abnormality, atraumatic


Eyes:  conjunctivae/corneas clear. PERRL, EOM's intact. Fundi benign


Throat:  Lips, mucosa, and tongue normal. Teeth and gums normal


Neck:  supple, symmetrical, trachea midline, no adenopathy, thyroid: not 

enlarged, symmetric, no tenderness/mass/nodules, no carotid bruit and no JVD


Lungs:  clear to auscultation bilaterally


Heart:  regular rate and rhythm, S1, S2 normal, no murmur, click, rub or gallop


Abdomen:  soft, RUQ tender +murphys. Bowel sounds normal. No masses,  no 

organomegaly


Extremities:  extremities normal, atraumatic, no cyanosis or edema


Pulses:  2+ and symmetric


Skin:  Skin color, texture, turgor normal. No rashes or lesions


Neurologic:  Grossly normal





Last 24 Hour Vital Signs








  Date Time  Temp Pulse Resp B/P (MAP) Pulse Ox O2 Delivery O2 Flow Rate FiO2


 


11/3/19 12:00 100.0 106 19 135/74 (94) 98   


 


11/3/19 09:00      Room Air  


 


11/3/19 08:00 98.4 104 20 150/93 (112) 99   


 


11/3/19 04:00 99.2 90 18 148/88 (108) 100   


 


11/3/19 00:00 98.8 84 18 134/80 (98) 100   


 


11/2/19 21:00      Room Air  


 


11/2/19 20:00 98.4 88 18 137/83 (101) 99   


 


11/2/19 18:29 98.1       


 


11/2/19 16:13      Room Air  


 


11/2/19 16:00 98.5 83 20 127/70 98 Room Air  


 


11/2/19 16:00 98.1 88 18 130/89 (103) 98   


 


11/2/19 15:40 98.2 83 18 110/60 100 Room Air  

















Intake and Output  


 


 11/2/19 11/3/19





 19:00 07:00


 


Intake Total 1370 ml 1000 ml


 


Balance 1370 ml 1000 ml


 


  


 


Intake IV Total 1370 ml 1000 ml


 


# Voids 3 2











Laboratory Tests








Test


  11/3/19


06:32


 


White Blood Count


  11.4 K/UL


(4.8-10.8)  H


 


Red Blood Count


  4.60 M/UL


(4.20-5.40)


 


Hemoglobin


  13.1 G/DL


(12.0-16.0)


 


Hematocrit


  39.6 %


(37.0-47.0)


 


Mean Corpuscular Volume 86 FL (80-99)  


 


Mean Corpuscular Hemoglobin


  28.6 PG


(27.0-31.0)


 


Mean Corpuscular Hemoglobin


Concent 33.2 G/DL


(32.0-36.0)


 


Red Cell Distribution Width


  11.0 %


(11.6-14.8)  L


 


Platelet Count


  254 K/UL


(150-450)


 


Mean Platelet Volume


  6.4 FL


(6.5-10.1)  L


 


Neutrophils (%) (Auto)


  79.1 %


(45.0-75.0)  H


 


Lymphocytes (%) (Auto)


  14.8 %


(20.0-45.0)  L


 


Monocytes (%) (Auto)


  5.7 %


(1.0-10.0)


 


Eosinophils (%) (Auto)


  0.1 %


(0.0-3.0)


 


Basophils (%) (Auto)


  0.3 %


(0.0-2.0)


 


Erythrocyte Sedimentation Rate


  11 MM/HR


(0-20)


 


Sodium Level


  137 MMOL/L


(136-145)


 


Potassium Level


  3.5 MMOL/L


(3.5-5.1)


 


Chloride Level


  103 MMOL/L


()


 


Carbon Dioxide Level


  27 MMOL/L


(21-32)


 


Anion Gap


  7 mmol/L


(5-15)


 


Blood Urea Nitrogen


  5 mg/dL (7-18)


L


 


Creatinine


  0.7 MG/DL


(0.55-1.30)


 


Estimat Glomerular Filtration


Rate > 60 mL/min


(>60)


 


Glucose Level


  117 MG/DL


()  H


 


Calcium Level


  8.0 MG/DL


(8.5-10.1)  L


 


Total Bilirubin


  0.5 MG/DL


(0.2-1.0)


 


Aspartate Amino Transf


(AST/SGOT) 15 U/L (15-37)


 


 


Alanine Aminotransferase


(ALT/SGPT) 30 U/L (12-78)


 


 


Alkaline Phosphatase


  73 U/L


()


 


C-Reactive Protein,


Quantitative 6.5 mg/dL


(0.00-0.90)  H


 


Total Protein


  6.9 G/DL


(6.4-8.2)


 


Albumin


  3.2 G/DL


(3.4-5.0)  L


 


Globulin 3.7 g/dL  


 


Albumin/Globulin Ratio


  0.9 (1.0-2.7)


L


 


Amylase Level


  40 U/L


()








Height (Feet):  5


Height (Inches):  1.00


Weight (Pounds):  224


Medications





Current Medications








 Medications


  (Trade)  Dose


 Ordered  Sig/Danita


 Route


 PRN Reason  Start Time


 Stop Time Status Last Admin


Dose Admin


 


 Acetaminophen


  (Tylenol)  650 mg  Q4H  PRN


 ORAL


 fever (temp>100.4F)  11/2/19 15:15


 12/2/19 15:14   


 


 


 Al Hydroxide/Mg


 Hydroxide


  (Mylanta II)  30 ml  Q6H  PRN


 ORAL


 dyspepsia  11/2/19 15:15


 12/2/19 15:14   


 


 


 Bisacodyl


  (Dulcolax)  10 mg  HSPRN  PRN


 RECTAL


 Constipation  11/2/19 15:15


 12/2/19 15:14   


 


 


 Ceftriaxone


 Sodium 1 gm/


 Sodium Chloride  55 ml @ 


 110 mls/hr  DAILY


 IVPB


   11/2/19 18:00


 11/9/19 17:59  11/3/19 08:27


 


 


 Dextrose


  (Dextrose 50%)  25 ml  Q30M  PRN


 IV


 Hypoglycemia  11/2/19 15:15


 12/2/19 15:14   


 


 


 Dextrose


  (Dextrose 50%)  50 ml  Q30M  PRN


 IV


 Hypoglycemia  11/2/19 15:15


 12/2/19 15:14   


 


 


 Diphenhydramine


 HCl


  (Benadryl)  25 mg  Q6H  PRN


 ORAL


 Itching/Pruritis  11/2/19 15:15


 12/2/19 15:14   


 


 


 Docusate Sodium


  (Colace)  100 mg  EVERY 12  HOURS


 ORAL


   11/2/19 21:00


 12/2/19 20:59  11/3/19 08:27


 


 


 Famotidine


  (Pepcid)  40 mg  DAILY


 ORAL


   11/3/19 09:00


 12/3/19 08:59  11/3/19 08:27


 


 


 Lorazepam


  (Ativan)  1 mg  Q4H  PRN


 ORAL


 For Anxiety  11/2/19 15:15


 11/9/19 15:14  11/3/19 11:28


 


 


 Magnesium


 Hydroxide


  (Mom)  30 ml  HSPRN  PRN


 ORAL


 Constipation  11/2/19 15:15


 12/2/19 15:14   


 


 


 Metronidazole  100 ml @ 


 100 mls/hr  Q8HR


 IVPB


   11/2/19 20:00


 11/9/19 19:59  11/3/19 05:27


 


 


 Morphine Sulfate


  (Morphine


 Sulfate)  1 mg  Q3H  PRN


 IVP


 Breakthrough Pain  11/2/19 15:15


 11/9/19 15:14   


 


 


 Morphine Sulfate


  (Morphine


 Sulfate)  2 mg  Q3H  PRN


 IVP


 Moderate Pain (Pain Scale 4-6)  11/2/19 15:15


 11/9/19 15:14  11/3/19 10:08


 


 


 Morphine Sulfate


  (Morphine


 Sulfate)  4 mg  Q4H  PRN


 IVP


 Severe Pain (Pain Scale 7-10)  11/2/19 20:30


 11/9/19 20:29   


 


 


 Ondansetron HCl


  (Zofran)  4 mg  Q6H  PRN


 IVP


 Nausea & Vomiting  11/2/19 15:15


 12/2/19 15:14   


 


 


 Sodium Chloride  1,000 ml @ 


 100 mls/hr  Q10H


 IV


   11/2/19 18:45


 12/2/19 18:44  11/3/19 04:34


 


 


 Temazepam


  (Restoril)  15 mg  HSPRN  PRN


 ORAL


 Insomnia  11/2/19 21:00


 11/9/19 20:59   


 











Assessment/Plan


Problem List:  


(1) Acute cholecystitis


Assessment & Plan:  44-year-old female with acute cholecystitis.  Afebrile, 

hemodynamic stable, leukocytosis worsening.  Ultrasound with stones.  HIDA 

positive.  Examination with focal right upper quadrant tenderness positive 

Tesfaye sign.  Patient is not improved on antibiotics since admission.





I discussed with patient the above findings and care plan.


We have discussed the possibility of improvement with IV antibiotics, bowel rest

, IV fluids.  We discussed the possibility of need for surgical intervention.


Patient has discussed with her family and at this time will continue with IV 

antibiotics and bowel rest.  We will plan for cholecystectomy if persistent 

symptoms and not improving over the next 24 hours.


Thank you for allowing participate patient's care.  We will follow with 

recognitions.


ICD Codes:  K81.0 - Acute cholecystitis


SNOMED:  44263697


(2) Abdominal pain


ICD Codes:  R10.9 - Unspecified abdominal pain


SNOMED:  48415923


(3) Obesity


ICD Codes:  E66.9 - Obesity, unspecified


SNOMED:  616608514, 316968650


(4) Symptomatic cholelithiasis


ICD Codes:  K80.20 - Calculus of gallbladder without cholecystitis without 

obstruction


SNOMED:  913048988, 136007284











Alexey Randall Nov 3, 2019 13:41

## 2019-11-03 NOTE — NUR
NURSE NOTES:

received report from PEDRO Ruiz. patient in bed. alert. oriented. verbally responsive. no 
respiratory distress noted. c/o pain on abd area. pain med due at 0930. IV on LAC 20 running 
NS@100. NPO. no skin issue. no islolation. bed in the lowest position and locked. call light 
within reach. will continue to provide plan of care.

## 2019-11-03 NOTE — NUR
NURSE NOTES:

patient had fever 100.9 at 1630. RN administered tylenol 650mg PO PRN for fever. rechecked 
@1700 fever was 101.2. /91/113, 20, 97%, pain 5/10. patient alert. oriented. easily 
fall asleep and arouse. Notified Dr. Vikram Coronel. MD is aware. no new order at this time.

## 2019-11-03 NOTE — NUR
NURSE NOTES:

Radiology dept stat rad called and informed that pt's hyda scan result is positive for 
cholecystitis. Called and left message to Dr Randall regarding scan result.

## 2019-11-03 NOTE — NUR
NURSE NOTES:

Patient seen by Dr. Vikram Coronel. new order of EKG tracing only and troponin for chest 
tightness.

## 2019-11-03 NOTE — NUR
NURSE NOTES:

patient c/o being NPO and not able to rest even with morphine for pain. HIDA scan resulted. 
patient asked what is her status at this time and wants to know what is the next step. 
notified Dr. Randall and MD will see the patient  later. Patient is aware the surgeon will 
come to see her soon.

## 2019-11-03 NOTE — NUR
NURSE NOTES:

Received report from PEDRO Hall. Pt resting in bed. AAO x 4, on room air. Pt is on NPO. Dr. Sue aware increased heart rate and BP. IV site intact and running NS 100cc/hr. Pt states 
pain is tolerable. No acute distress noted at this time. Bed locked, lowest position, alarm 
on, call light within reach. Will continue to monitor.

## 2019-11-03 NOTE — DIAGNOSTIC IMAGING REPORT
Indication: Abdominal Pain

 

Technique: 5.3 mCi of technetium 99 m-Choletec was injected intravenously. Planar

imaging of the abdomen was then performed every 5 minutes up to 30 minutes and every

10 minutes up to one hour. Oblique views were also obtained.  Delayed 4 hour planar

images obtained also.

 

Findings: There is prompt uptake within the liver with good washout of radiotracer

from the liver on subsequent imaging. There is excretion into the biliary ducts.

 

There is no gallbladder activity indicating cystic duct obstruction. Bowel activity

is demonstrated in a timely fashion indicating patency of the common bile duct.

 

Impression: Cystic duct obstruction compatible with cholecystitis

 

[facility] has communicated the preliminary results to the Emergency

Department.  Their findings are largely concordant with this report.

## 2019-11-03 NOTE — NUR
NURSE NOTES:

Called Dr Liang and left a message regarding Hyda scan result positive cholecystitis. 
Awaiting for call back.

## 2019-11-04 VITALS — SYSTOLIC BLOOD PRESSURE: 136 MMHG | DIASTOLIC BLOOD PRESSURE: 84 MMHG

## 2019-11-04 VITALS — DIASTOLIC BLOOD PRESSURE: 75 MMHG | SYSTOLIC BLOOD PRESSURE: 116 MMHG

## 2019-11-04 VITALS — SYSTOLIC BLOOD PRESSURE: 115 MMHG | DIASTOLIC BLOOD PRESSURE: 76 MMHG

## 2019-11-04 VITALS — SYSTOLIC BLOOD PRESSURE: 118 MMHG | DIASTOLIC BLOOD PRESSURE: 73 MMHG

## 2019-11-04 VITALS — DIASTOLIC BLOOD PRESSURE: 68 MMHG | SYSTOLIC BLOOD PRESSURE: 117 MMHG

## 2019-11-04 VITALS — SYSTOLIC BLOOD PRESSURE: 118 MMHG | DIASTOLIC BLOOD PRESSURE: 84 MMHG

## 2019-11-04 LAB
ADD MANUAL DIFF: NO
ANION GAP SERPL CALC-SCNC: 8 MMOL/L (ref 5–15)
BASOPHILS NFR BLD AUTO: 1.2 % (ref 0–2)
BUN SERPL-MCNC: 5 MG/DL (ref 7–18)
CALCIUM SERPL-MCNC: 8.6 MG/DL (ref 8.5–10.1)
CHLORIDE SERPL-SCNC: 107 MMOL/L (ref 98–107)
CO2 SERPL-SCNC: 27 MMOL/L (ref 21–32)
CREAT SERPL-MCNC: 0.6 MG/DL (ref 0.55–1.3)
EOSINOPHIL NFR BLD AUTO: 1.1 % (ref 0–3)
ERYTHROCYTE [DISTWIDTH] IN BLOOD BY AUTOMATED COUNT: 11.4 % (ref 11.6–14.8)
HCT VFR BLD CALC: 38.6 % (ref 37–47)
HGB BLD-MCNC: 12.9 G/DL (ref 12–16)
LYMPHOCYTES NFR BLD AUTO: 24.7 % (ref 20–45)
MCV RBC AUTO: 85 FL (ref 80–99)
MONOCYTES NFR BLD AUTO: 5.9 % (ref 1–10)
NEUTROPHILS NFR BLD AUTO: 67.1 % (ref 45–75)
PLATELET # BLD: 260 K/UL (ref 150–450)
POTASSIUM SERPL-SCNC: 3.5 MMOL/L (ref 3.5–5.1)
RBC # BLD AUTO: 4.56 M/UL (ref 4.2–5.4)
SODIUM SERPL-SCNC: 142 MMOL/L (ref 136–145)
WBC # BLD AUTO: 9.7 K/UL (ref 4.8–10.8)

## 2019-11-04 RX ADMIN — DOCUSATE SODIUM SCH MG: 100 CAPSULE, LIQUID FILLED ORAL at 21:10

## 2019-11-04 RX ADMIN — DOCUSATE SODIUM SCH MG: 100 CAPSULE, LIQUID FILLED ORAL at 09:50

## 2019-11-04 RX ADMIN — SODIUM CHLORIDE SCH MLS/HR: 0.9 INJECTION INTRAVENOUS at 09:50

## 2019-11-04 NOTE — NUR
NURSE NOTES:

Pt has low grade fever 99.2F. RN provided ice pack and helped to feel comfort. Will continue 
to monitor.

## 2019-11-04 NOTE — NUR
NURSE NOTES:

Received report from PEDRO Hobson. Patient a/a/o x4, breathing unlabored on room air without 
distress. Denies pain or discomfort at this time. IV noted on right arm intact. Bed placed 
at the lowest with brake and siderails up for safety. Call light placed within reach. Will 
continue to monitor and provide care as ordered.

## 2019-11-04 NOTE — SURGERY PROGRESS NOTE
Surgery Progress Note


Subjective


Additional Comments


Leukocytosis improved.  Labs improved.  Overall improving.  Pain a little bit 

improved today.  No nausea vomiting fever chills.  Pending surgery.





Objective





Last 24 Hour Vital Signs








  Date Time  Temp Pulse Resp B/P (MAP) Pulse Ox O2 Delivery O2 Flow Rate FiO2


 


11/4/19 08:00 97.7 98 20 117/68 (84) 97   


 


11/4/19 04:00 99.2 102 18 118/84 (95) 97   


 


11/4/19 00:00 99.2 105 18 115/76 (89) 98   


 


11/3/19 21:00      Room Air  


 


11/3/19 20:00 99.2 110 19 148/87 (107) 97   


 


11/3/19 17:00 101.2       


 


11/3/19 16:00 100.9 117 20 148/91 (110) 97   


 


11/3/19 12:00 100.0 106 19 135/74 (94) 98   








I&O











Intake and Output  


 


 11/3/19 11/4/19





 19:00 07:00


 


Intake Total 2220 ml 1400 ml


 


Balance 2220 ml 1400 ml


 


  


 


Intake IV Total 2220 ml 1400 ml


 


# Voids 6 5








Cardiovascular:  RSR


Respiratory:  clear


Abdomen:  soft, flat, tenderness, present bowel sounds, non-distended


Extremities:  no tenderness, no cyanosis





Laboratory Tests








Test


  11/4/19


09:30


 


White Blood Count


  9.7 K/UL


(4.8-10.8)


 


Red Blood Count


  4.56 M/UL


(4.20-5.40)


 


Hemoglobin


  12.9 G/DL


(12.0-16.0)


 


Hematocrit


  38.6 %


(37.0-47.0)


 


Mean Corpuscular Volume 85 FL (80-99)  


 


Mean Corpuscular Hemoglobin


  28.3 PG


(27.0-31.0)


 


Mean Corpuscular Hemoglobin


Concent 33.4 G/DL


(32.0-36.0)


 


Red Cell Distribution Width


  11.4 %


(11.6-14.8)  L


 


Platelet Count


  260 K/UL


(150-450)


 


Mean Platelet Volume


  6.5 FL


(6.5-10.1)


 


Neutrophils (%) (Auto)


  67.1 %


(45.0-75.0)


 


Lymphocytes (%) (Auto)


  24.7 %


(20.0-45.0)


 


Monocytes (%) (Auto)


  5.9 %


(1.0-10.0)


 


Eosinophils (%) (Auto)


  1.1 %


(0.0-3.0)


 


Basophils (%) (Auto)


  1.2 %


(0.0-2.0)


 


Sodium Level


  142 MMOL/L


(136-145)


 


Potassium Level


  3.5 MMOL/L


(3.5-5.1)


 


Chloride Level


  107 MMOL/L


()


 


Carbon Dioxide Level


  27 MMOL/L


(21-32)


 


Anion Gap


  8 mmol/L


(5-15)


 


Blood Urea Nitrogen


  5 mg/dL (7-18)


L


 


Creatinine


  0.6 MG/DL


(0.55-1.30)


 


Estimat Glomerular Filtration


Rate > 60 mL/min


(>60)


 


Glucose Level


  93 MG/DL


()


 


Calcium Level


  8.6 MG/DL


(8.5-10.1)











Plan


Problems:  


(1) Acute cholecystitis


Assessment & Plan:  44-year-old female with acute cholecystitis.  Afebrile, 

hemodynamic stable, leukocytosis worsening.  Ultrasound with stones.  HIDA 

positive.  Examination with focal right upper quadrant tenderness positive 

Tesfaye sign.  Patient is not improved on antibiotics since admission.





I discussed with patient the above findings and care plan.


We have discussed the possibility of improvement with IV antibiotics, bowel rest

, IV fluids.  We discussed the possibility of need for surgical intervention.


Patient has discussed with her family and at this time will continue with IV 

antibiotics and bowel rest.  We will plan for cholecystectomy if persistent 

symptoms and not improving over the next 24 hours.


Consent


OR tomorrow for jeff ayo 


Thank you for allowing participate patient's care.  We will follow with 

recognitions.





(2) Abdominal pain


(3) Obesity


(4) Symptomatic cholelithiasis











Alexey Randall Nov 4, 2019 11:06

## 2019-11-04 NOTE — ANETHESIA PREOPERATIVE EVAL
Anesthesia Pre-op PMH/ROS


General


Date of Evaluation:  Nov 4, 2019


Time of Evaluation:  13:25


Anesthesiologist:  Kaia


ASA Score:  ASA 2


Mallampati Score


Class I : Soft palate, uvula, fauces, pillars visible


Class II: Soft palate, uvula, fauces visible


Class III: Soft palate, base of uvula visible


Class IV: Only hard plate visible


Mallampati Classification:  Class II


Surgeon:  Tonia


Diagnosis:  Epigastric Pain


Surgical Procedure:  Laparoscopic Cholecystectomy


Anesthesia History:  none


Family History:  no anesthesia problems


Allergies:  


Coded Allergies:  


     No Known Allergies (Unverified , 11/2/19)


Medications:  see eMAR


Patient NPO?:  Yes





Past Medical History


Cardiovascular:  Reports: HTN


Other:  obesity - BMI 44 Morbid


PSxH Narrative:


Breast Sx





Anesthesia Pre-op Phys. Exam


Physician Exam





Last Vital Signs








  Date Time  Temp Pulse Resp B/P (MAP) Pulse Ox O2 Delivery O2 Flow Rate FiO2


 


11/4/19 08:00 97.7 98 20 117/68 (84) 97   


 


11/3/19 21:00      Room Air  








Constitutional:  NAD


Neurologic:  CN 2-12 intact


Cardiovascular:  RRR


Respiratory:  CTA


Gastrointestinal:  S/NT/ND





Airway Exam


Mallampati Score:  Class II


MO:  full


Teeth:  missing, intact





Anesthesia Pre-op A/P


Labs





Hematology








Test


  11/4/19


09:30


 


White Blood Count


  9.7 K/UL


(4.8-10.8)


 


Red Blood Count


  4.56 M/UL


(4.20-5.40)


 


Hemoglobin


  12.9 G/DL


(12.0-16.0)


 


Hematocrit


  38.6 %


(37.0-47.0)


 


Mean Corpuscular Volume 85 FL (80-99)  


 


Mean Corpuscular Hemoglobin


  28.3 PG


(27.0-31.0)


 


Mean Corpuscular Hemoglobin


Concent 33.4 G/DL


(32.0-36.0)


 


Red Cell Distribution Width


  11.4 %


(11.6-14.8)  L


 


Platelet Count


  260 K/UL


(150-450)


 


Mean Platelet Volume


  6.5 FL


(6.5-10.1)


 


Neutrophils (%) (Auto)


  67.1 %


(45.0-75.0)


 


Lymphocytes (%) (Auto)


  24.7 %


(20.0-45.0)


 


Monocytes (%) (Auto)


  5.9 %


(1.0-10.0)


 


Eosinophils (%) (Auto)


  1.1 %


(0.0-3.0)


 


Basophils (%) (Auto)


  1.2 %


(0.0-2.0)








Chemistry








Test


  11/4/19


09:30


 


Sodium Level


  142 MMOL/L


(136-145)


 


Potassium Level


  3.5 MMOL/L


(3.5-5.1)


 


Chloride Level


  107 MMOL/L


()


 


Carbon Dioxide Level


  27 MMOL/L


(21-32)


 


Anion Gap


  8 mmol/L


(5-15)


 


Blood Urea Nitrogen


  5 mg/dL (7-18)


L


 


Creatinine


  0.6 MG/DL


(0.55-1.30)


 


Estimat Glomerular Filtration


Rate > 60 mL/min


(>60)


 


Glucose Level


  93 MG/DL


()


 


Calcium Level


  8.6 MG/DL


(8.5-10.1)











Risk Assessment & Plan


Assessment:


ASA 2


Plan:


GA


Status Change Before Surgery:  No





Pre-Antibiotics


Drug:  Rogerio Andino MD Nov 4, 2019 12:50

## 2019-11-04 NOTE — GENERAL PROGRESS NOTE
Assessment/Plan


Problem List:  


(1) Symptomatic cholelithiasis


ICD Codes:  K80.20 - Calculus of gallbladder without cholecystitis without 

obstruction


SNOMED:  930167375, [id number]


(2) Acute cholecystitis


ICD Codes:  K81.0 - Acute cholecystitis


SNOMED:  29933201


(3) Obesity


ICD Codes:  E66.9 - Obesity, unspecified


SNOMED:  048103605, 004505494


(4) Abdominal pain


ICD Codes:  R10.9 - Unspecified abdominal pain


SNOMED:  21559843


Status:  stable


Assessment/Plan:


44-year-old female with a history of obesity presenting with symptomatic 

cholelithiasis.





#Acute cholecystitis


#Abdominal pain


> HIDA positive


-Admit to [facility]


-[facility] general surgery recommendations: Dr. Randall. NPO for surgery 11/5 /19


-Continue Rocephin 1 g IV daily (11/2/19 - )


-Continue metronidazole 500 mg every 8 hours (11/2/19 - )


-blood cultures x 2


-Tylenol and morphine as needed for pain


-Zofran as needed for nausea


-Sodium chloride at 100 cc/h





#Chest pressure on 11/3/19.  Suspect related to anxiety. resolved 


-EKG


-Troponin, negative 





#Obesity


-Counseled patient on weight loss





FENPPX





DVTPPX: SCDs,


GI PPX: pepcid


Fluids: as above


Diet: NPO except meds


Lines: peripheral


PT/OT: none needd


Code status: Full





Dispo: Home after surgery


Reason for Continued Hospitalization: Abdominal pain





39 minutes spent on this encounter. Discussed with RN, Patient, and general 

surgery. > 50% spent on counseling and care coordination. 





Time of note may not reflect time patient was seen.





Subjective


Date patient seen:  Nov 4, 2019


ROS Limited/Unobtainable:  No


Constitutional:  Denies: no symptoms, chills, diaphoresis, fever, malaise, 

weakness, other


HEENT:  Denies: no symptoms, eye pain, blurred vision, tearing, double vision, 

ear pain, ear discharge, nose pain, nose congestion, throat pain, throat 

swelling, mouth pain, mouth swelling, other


Respiratory:  Denies: no symptoms, cough, orthopnea, shortness of breath, SOB 

with excertion, SOB at rest, sputum, stridor, wheezing, other


Gastrointestinal/Abdominal:  Reports: no symptoms, abdomen distended, abdominal 

pain, black stools, tarry stools, blood in stool, constipated, diarrhea, 

difficulty swallowing, nausea, poor appetite, poor fluid intake, rectal bleeding

, vomiting, other


Genitourinary:  Denies: no symptoms, burning, discharge, frequency, flank pain, 

hematuria, incontinence, pain, urgency, other


Neurologic/Psychiatric:  Denies: no symptoms, anxiety, depressed, emotional 

problems, headache, numbness, paresthesia, pre-existing deficit, seizure, 

tingling, tremors, weakness, other


Endocrine:  Denies: no symptoms, excessive sweating, flushing, intolerance to 

cold, intolerance to heat, increased hunger, increased thirst, increased urine, 

unexplained weight gain, unexplained weight loss, other


Hematologic/Lymphatic:  Denies: no symptoms, anemia, easy bleeding, easy 

bruising, other


Allergies:  


Coded Allergies:  


     No Known Allergies (Unverified , 11/2/19)


Subjective


seen and examined at bedside. no acute overnight events





Objective





Last 24 Hour Vital Signs








  Date Time  Temp Pulse Resp B/P (MAP) Pulse Ox O2 Delivery O2 Flow Rate FiO2


 


11/4/19 09:00      Room Air  


 


11/4/19 08:00 97.7 98 20 117/68 (84) 97   


 


11/4/19 04:00 99.2 102 18 118/84 (95) 97   


 


11/4/19 00:00 99.2 105 18 115/76 (89) 98   


 


11/3/19 21:00      Room Air  


 


11/3/19 20:00 99.2 110 19 148/87 (107) 97   


 


11/3/19 17:00 101.2       


 


11/3/19 16:00 100.9 117 20 148/91 (110) 97   

















Intake and Output  


 


 11/3/19 11/4/19





 19:00 07:00


 


Intake Total 2220 ml 1400 ml


 


Balance 2220 ml 1400 ml


 


  


 


Intake IV Total 2220 ml 1400 ml


 


# Voids 6 5








Laboratory Tests


11/4/19 09:30: 


White Blood Count 9.7, Red Blood Count 4.56, Hemoglobin 12.9, Hematocrit 38.6, 

Mean Corpuscular Volume 85, Mean Corpuscular Hemoglobin 28.3, Mean Corpuscular 

Hemoglobin Concent 33.4, Red Cell Distribution Width 11.4L, Platelet Count 260, 

Mean Platelet Volume 6.5, Neutrophils (%) (Auto) 67.1, Lymphocytes (%) (Auto) 

24.7, Monocytes (%) (Auto) 5.9, Eosinophils (%) (Auto) 1.1, Basophils (%) (Auto

) 1.2, Sodium Level 142, Potassium Level 3.5, Chloride Level 107, Carbon 

Dioxide Level 27, Anion Gap 8, Blood Urea Nitrogen 5L, Creatinine 0.6, Estimat 

Glomerular Filtration Rate > 60, Glucose Level 93, Calcium Level 8.6


Height (Feet):  5


Height (Inches):  1.00


Weight (Pounds):  224


Objective


General Appearance:  WD/WN, no apparent distress, alert, obese


EENT:  PERRL/EOMI, normal ENT inspection


Neck:  non-tender, normal alignment, supple


Cardiovascular:  normal peripheral pulses, normal rate, regular rhythm, no JVD


Respiratory/Chest:  chest wall non-tender, lungs clear, normal breath sounds


Abdomen:  normal bowel sounds, other - Right upper quadrant tenderness to 

palpation. +grimm's sign


Extremities:  normal range of motion, non-tender, normal inspection


Edema:  other - No lower extremity edema bilaterally


Neurologic:  CNs II-XII grossly normal, no motor/sensory deficits, alert, 

oriented x 3


Skin:  normal pigmentation, warm/dry











Chun Bhatti M.D. Nov 4, 2019 14:09

## 2019-11-05 VITALS — SYSTOLIC BLOOD PRESSURE: 156 MMHG | DIASTOLIC BLOOD PRESSURE: 81 MMHG

## 2019-11-05 VITALS — SYSTOLIC BLOOD PRESSURE: 131 MMHG | DIASTOLIC BLOOD PRESSURE: 82 MMHG

## 2019-11-05 VITALS — SYSTOLIC BLOOD PRESSURE: 136 MMHG | DIASTOLIC BLOOD PRESSURE: 84 MMHG

## 2019-11-05 VITALS — DIASTOLIC BLOOD PRESSURE: 78 MMHG | SYSTOLIC BLOOD PRESSURE: 140 MMHG

## 2019-11-05 VITALS — DIASTOLIC BLOOD PRESSURE: 68 MMHG | SYSTOLIC BLOOD PRESSURE: 121 MMHG

## 2019-11-05 VITALS — DIASTOLIC BLOOD PRESSURE: 93 MMHG | SYSTOLIC BLOOD PRESSURE: 135 MMHG

## 2019-11-05 VITALS — SYSTOLIC BLOOD PRESSURE: 139 MMHG | DIASTOLIC BLOOD PRESSURE: 73 MMHG

## 2019-11-05 VITALS — DIASTOLIC BLOOD PRESSURE: 91 MMHG | SYSTOLIC BLOOD PRESSURE: 138 MMHG

## 2019-11-05 VITALS — SYSTOLIC BLOOD PRESSURE: 133 MMHG | DIASTOLIC BLOOD PRESSURE: 80 MMHG

## 2019-11-05 VITALS — DIASTOLIC BLOOD PRESSURE: 72 MMHG | SYSTOLIC BLOOD PRESSURE: 122 MMHG

## 2019-11-05 VITALS — SYSTOLIC BLOOD PRESSURE: 136 MMHG | DIASTOLIC BLOOD PRESSURE: 78 MMHG

## 2019-11-05 VITALS — SYSTOLIC BLOOD PRESSURE: 129 MMHG | DIASTOLIC BLOOD PRESSURE: 77 MMHG

## 2019-11-05 VITALS — SYSTOLIC BLOOD PRESSURE: 139 MMHG | DIASTOLIC BLOOD PRESSURE: 84 MMHG

## 2019-11-05 VITALS — SYSTOLIC BLOOD PRESSURE: 135 MMHG | DIASTOLIC BLOOD PRESSURE: 85 MMHG

## 2019-11-05 VITALS — DIASTOLIC BLOOD PRESSURE: 78 MMHG | SYSTOLIC BLOOD PRESSURE: 131 MMHG

## 2019-11-05 LAB
ADD MANUAL DIFF: NO
ALBUMIN SERPL-MCNC: 2.7 G/DL (ref 3.4–5)
ALBUMIN/GLOB SERPL: 0.7 {RATIO} (ref 1–2.7)
ALP SERPL-CCNC: 62 U/L (ref 46–116)
ALT SERPL-CCNC: 33 U/L (ref 12–78)
AMYLASE SERPL-CCNC: 28 U/L (ref 25–115)
ANION GAP SERPL CALC-SCNC: 12 MMOL/L (ref 5–15)
APTT BLD: 32 SEC (ref 23–33)
AST SERPL-CCNC: 31 U/L (ref 15–37)
BASOPHILS NFR BLD AUTO: 0.7 % (ref 0–2)
BILIRUB SERPL-MCNC: 0.4 MG/DL (ref 0.2–1)
BUN SERPL-MCNC: 6 MG/DL (ref 7–18)
CALCIUM SERPL-MCNC: 8.3 MG/DL (ref 8.5–10.1)
CHLORIDE SERPL-SCNC: 107 MMOL/L (ref 98–107)
CO2 SERPL-SCNC: 22 MMOL/L (ref 21–32)
CREAT SERPL-MCNC: 0.4 MG/DL (ref 0.55–1.3)
EOSINOPHIL NFR BLD AUTO: 3 % (ref 0–3)
ERYTHROCYTE [DISTWIDTH] IN BLOOD BY AUTOMATED COUNT: 10.9 % (ref 11.6–14.8)
GLOBULIN SER-MCNC: 4 G/DL
HCT VFR BLD CALC: 36.5 % (ref 37–47)
HGB BLD-MCNC: 12.5 G/DL (ref 12–16)
INR PPP: 0.9 (ref 0.9–1.1)
LYMPHOCYTES NFR BLD AUTO: 24.2 % (ref 20–45)
MCV RBC AUTO: 84 FL (ref 80–99)
MONOCYTES NFR BLD AUTO: 5.9 % (ref 1–10)
NEUTROPHILS NFR BLD AUTO: 66.2 % (ref 45–75)
PLATELET # BLD: 251 K/UL (ref 150–450)
POTASSIUM SERPL-SCNC: 3.4 MMOL/L (ref 3.5–5.1)
RBC # BLD AUTO: 4.33 M/UL (ref 4.2–5.4)
SODIUM SERPL-SCNC: 141 MMOL/L (ref 136–145)
WBC # BLD AUTO: 7.2 K/UL (ref 4.8–10.8)

## 2019-11-05 PROCEDURE — 0FT44ZZ RESECTION OF GALLBLADDER, PERCUTANEOUS ENDOSCOPIC APPROACH: ICD-10-PCS

## 2019-11-05 RX ADMIN — DOCUSATE SODIUM SCH MG: 100 CAPSULE, LIQUID FILLED ORAL at 17:18

## 2019-11-05 RX ADMIN — HYDROCODONE BITARTRATE AND ACETAMINOPHEN PRN TAB: 10; 325 TABLET ORAL at 23:50

## 2019-11-05 RX ADMIN — DOCUSATE SODIUM SCH MG: 100 CAPSULE, LIQUID FILLED ORAL at 09:17

## 2019-11-05 RX ADMIN — SODIUM CHLORIDE SCH MLS/HR: 0.9 INJECTION INTRAVENOUS at 09:18

## 2019-11-05 RX ADMIN — HYDROCODONE BITARTRATE AND ACETAMINOPHEN PRN TAB: 10; 325 TABLET ORAL at 17:59

## 2019-11-05 NOTE — NUR
NURSE NOTES:

Received report from Minsu, RN. Pt is alert and awake, on RA, no complaints of pain or 
distress noted. IV site leaking, will place a new one. Bed locked in lowest position, side 
rails up, call light within reach. Will continue to monitor.

## 2019-11-05 NOTE — NUR
NURSE NOTES:

Pt returned from laparoscopic cholecystectomy surgery. Received an update from Sarah Cancino RN. Pt is awake and alert, on 3L NC. No apparent distress noted. VS obtained, see flowsheet. 
Pain level is at 4. Dressings x 4 on abd, intact and dry. Will continue to monitor.

## 2019-11-05 NOTE — PRE-PROCEDURE NOTE/ATTESTATION
Pre-Procedure Note/Attestation


Complete Prior to Procedure


Procedure Narrative:


lap ayo





Indications for Procedure


Pre-Operative Diagnosis:


acute cholecystitis





Attestation


I attest that I discussed the nature of the procedure; its benefits; risks and 

complications; and alternatives (and the risks and benefits of such alternatives

), prior to the procedure, with the patient (or the patient's legal 

representative).





I attest that, if there was a reasonable possibility of needing a blood 

transfusion, the patient (or the patient's legal representative) was given the 

[facility] standardized written summary, pursuant 

to the Norm Nain Blood Safety Act ([place] Health and Safety Code # 1645, as 

amended).





I attest that I re-evaluated the patient just prior to the surgery and that 

there has been no change in the patient's H&P, except as documented below:











Alexey Randall Nov 5, 2019 13:07

## 2019-11-05 NOTE — NUR
NURSE NOTES:

Called Dr. Liang's office, spoke with Jamarcus regarding pt's K 3.4. Awaiting for MD's call.

## 2019-11-05 NOTE — NUR
*-* INSURANCE *-*



ALL AVAILABLE CLINICALS HAVE BEEN FAXED TO:



[employer]

NCM:DAVY

P: [phone]

F: [phone]

## 2019-11-05 NOTE — OPERATIVE NOTE - DICTATED
DATE OF OPERATION:  11/05/2019

PREOPERATIVE DIAGNOSIS:  Acute cholecystitis.



POSTOPERATIVE DIAGNOSIS:  Acute cholecystitis.



OPERATION PERFORMED:  Laparoscopic cholecystectomy.



ATTENDING SURGEON:  Alexey Randall M.D.



ASSISTANT:  None.



ANESTHESIOLOGIST:  Camron Gonzalez M.D.



ANESTHESIA:  General GTA.



ESTIMATED BLOOD LOSS:  Minimal.



IV FLUIDS:  Please see anesthesia records.



COMPLICATIONS:  None.



DRAINS:  None.



COUNTS:  Sponge and needle count are correct x2.



ANTIBIOTICS:  The patient is on scheduled intravenous antibiotics for acute

active inflammatory process.



WOUND CLASSIFICATION:  Class 3.



SPECIMENS:  Gallbladder and retained stones sent to pathology for review.



INDICATIONS FOR PROCEDURE:  This is a 44-year-old female, who presented to

the emergency department at [facility] complaining of

worsening abdominal pain in the right upper quadrant with radiation to the

back, nausea, and emesis.  The patient states acute onset, unrelenting and

not improving on IV antibiotics and bowel rest and fluids.  HIDA scan

performed identifying nonvisualization of the gallbladder consistent with

acute cholecystitis.  Leukocytosis.  History of similar events 5 months

ago, but not as severe.  Given these findings, surgery was indicated and

recommended.  Risks, benefits, and alternatives were discussed patient in

detail, who expressed understanding and consented to surgery.



OPERATIVE NOTE:  The patient was taken to the operating room and placed on

the operating table in supine position with bilateral arms out.  All bony

prominences were well padded.  SCDs placed.  Preoperative time-out taken

in identifying the patient, procedure, operative staff, and surgical

staff.  General anesthesia was induced and the patient was intubated.  The

patient was already on scheduled IV antibiotics.  The abdomen was clipped,

prepped, and draped in standard surgical fashion.  A local anesthetic was

infiltrated throughout the procedure at skin incision and port sites to

the patient's comfort.  An infraumbilical incision was made and carried

down through the subcutaneous tissue to the fascia.  The fascia was

elevated and incised.  Entry into the abdomen was obtained using open

Armida technique without complication.  A 12 mm Armida trocar was inserted

and the abdomen was insufflated to 12 to 15 mmHg.  The patient tolerated

the insufflation well.  The laparoscope was inserted and the abdomen was

inspected.  No injury from initial trocar placement.  No significant

intra-abdominal pathology identified.  The patient had a fairly boggy

liver with blunted edges and omentum in the right upper quadrant over

draping the gallbladder.  Secondary trocars were placed under direct

visualization beginning with a 12 mm epigastric, followed by two 5 mm

right subcostal.  The patient was placed in reverse Trendelenburg with

left side down.  Laparoscopic graspers were used and these omental

adhesions to the gallbladder were slowly dissected down followed by

identification of the gallbladder.  The gallbladder was noted to have a

significantly thickened wall with edema.  The dome of the gallbladder was

grasped and retracted over the liver.  Infundibulum was grasped and

retracted towards the right lower quadrant.  Gently, the peritoneal

adhesions to the gallbladder were taken down around the area of the

infundibulum until the critical view could be identified.  The cystic duct

and cystic artery were identified as only two structures entering the

gallbladder with the clear visualization of liver on both ends identifying

a clear critical view.  The cystic artery was then doubly clipped and

divided.  Once clipped and divided, it was noted to be pulsatile.  _____

was noted to be pulsating clearly noting the cystic artery.  The only

remaining structure entering into the infundibulum was the cystic duct.

Cystic duct was doubly clipped and divided.  The gallbladder was taken off

the remaining liver attachments using electrocautery.  There was a fairly

thickened gallbladder wall easily identifiable and dissectible.

Hemostasis was obtained from the liver bed with electrocautery.

Gallbladder and contained stones were placed in endoscopic retrieval bag

and removed from the abdomen using the umbilical port site.  The right

upper quadrant was irrigated and suctioned until clear.  Two Surgicel's

were left in the liver bed to ensure hemostasis.  At this time, we began

the conclusion of our procedure.  The bed was flattened out and the

trocars were removed under direct visualization without complication.  The

umbilical trocar site fascia was reapproximated using a figure-of-eight #0

Vicryl suture.  The remaining skin incisions were reapproximated using 4-0

Monocryl subcuticular interrupted sutures.  Benzoin, Steri-Strips, and

dressings were applied.  The patient tolerated the procedure well, was

extubated, and taken to the postanesthetic care unit in stable

condition.









  ______________________________________________

  Alexey Randall M.D.





DR:  AMBROSE

D:  11/05/2019 15:05

T:  11/05/2019 21:29

JOB#:  [id number]

CC:

## 2019-11-05 NOTE — BRIEF OPERATIVE NOTE
Immediate Post Operative Note


Operative Note


Pre-op Diagnosis:


acute cholecystitis


Procedure:


lap ayo


Post-op Diagnosis:  same as pre-op


Surgeon:  cynthia


Anesthesiologist:  apolonia


Anesthesia:  general, local


Specimen:  yes


Complications:  none


Condition:  stable


Fluids:  see records


Estimated Blood Loss:  minimal


Drains:  none


Implant(s) used?:  No











Alexey Randall Nov 5, 2019 14:56

## 2019-11-05 NOTE — NUR
NURSE NOTES:

Spoke to Lashell in pharmacy about rescheduling Flagyl. She said it's ok to give it as a late 
admin at 1600.

## 2019-11-05 NOTE — IMMEDIATE POST-OP EVALUATION
Immediate Post-Op Evalulation


Immediate Post-Op Evalulation


Procedure:  Laparoscopic cholecystectomy


Date of Evaluation:  Nov 5, 2019


Time of Evaluation:  14:21


IV Fluids:  1000


Blood Products:  none


Estimated Blood Loss:  50


Urinary Output:  none


Blood Pressure Systolic:  132


Blood Pressure Diastolic:  74


Pulse Rate:  86


Respiratory Rate:  22


O2 Sat by Pulse Oximetry:  99


Temperature (Fahrenheit):  98.6


Pain Score (1-10):  1


Nausea:  No


Vomiting:  No


Complications


none


Patient Status:  reacts, patent, extubated, none


Hydration Status:  adequate











Camron Gonzalez MD Nov 5, 2019 14:23

## 2019-11-05 NOTE — GENERAL PROGRESS NOTE
Assessment/Plan


Problem List:  


(1) Symptomatic cholelithiasis


ICD Codes:  K80.20 - Calculus of gallbladder without cholecystitis without 

obstruction


SNOMED:  866988888, 575382565


(2) Acute cholecystitis


ICD Codes:  K81.0 - Acute cholecystitis


SNOMED:  66413002


(3) Obesity


ICD Codes:  E66.9 - Obesity, unspecified


SNOMED:  215043368, 052421270


(4) Abdominal pain


ICD Codes:  R10.9 - Unspecified abdominal pain


SNOMED:  37163266


Status:  stable


Assessment/Plan:


44-year-old female with a history of obesity presenting with symptomatic 

cholelithiasis.





#Acute cholecystitis


#Abdominal pain


> HIDA positive


-Admit to [facility]


-Appreciate general surgery recommendations: Dr. Randall. NPO for surgery 11/5 /19


-Continue Rocephin 1 g IV daily (11/2/19 - )


-Continue metronidazole 500 mg every 8 hours (11/2/19 - )


-blood cultures x 2


-Tylenol and morphine as needed for pain


-Zofran as needed for nausea


-Sodium chloride at 100 cc/h





#Chest pressure on 11/3/19.  Suspect related to anxiety. resolved 


-EKG


-Troponin, negative 





#Obesity


-Counseled patient on weight loss





#Hypokalemia 


replace today





FENPPX





DVTPPX: SCDs,


GI PPX: pepcid


Fluids: as above


Diet: NPO except meds


Lines: peripheral


PT/OT: none needd


Code status: Full





Dispo: Home after surgery


Reason for Continued Hospitalization: Abdominal pain





39 minutes spent on this encounter. Discussed with RN, Patient, and general 

surgery. > 50% spent on counseling and care coordination. 





Time of note may not reflect time patient was seen.





Subjective


Date patient seen:  Nov 5, 2019


ROS Limited/Unobtainable:  No


Constitutional:  Denies: no symptoms, chills, diaphoresis, fever, malaise, 

weakness, other


HEENT:  Denies: no symptoms, eye pain, blurred vision, tearing, double vision, 

ear pain, ear discharge, nose pain, nose congestion, throat pain, throat 

swelling, mouth pain, mouth swelling, other


Cardiovascular:  Denies: no symptoms, chest pain, edema, irregular heart rate, 

lightheadedness, palpitations, syncope, other


Respiratory:  Denies: no symptoms, cough, orthopnea, shortness of breath, SOB 

with excertion, SOB at rest, sputum, stridor, wheezing, other


Gastrointestinal/Abdominal:  Reports: no symptoms, abdomen distended, black 

stools, tarry stools, blood in stool, constipated, diarrhea, difficulty 

swallowing, nausea, poor appetite, poor fluid intake, rectal bleeding, vomiting

, other


Genitourinary:  Denies: no symptoms, burning, discharge, frequency, flank pain, 

hematuria, incontinence, pain, urgency, other


Neurologic/Psychiatric:  Denies: no symptoms, anxiety, depressed, emotional 

problems, headache, numbness, paresthesia, pre-existing deficit, seizure, 

tingling, tremors, weakness, other


Endocrine:  Denies: no symptoms, excessive sweating, flushing, intolerance to 

cold, intolerance to heat, increased hunger, increased thirst, increased urine, 

unexplained weight gain, unexplained weight loss, other


Allergies:  


Coded Allergies:  


     No Known Allergies (Unverified , 11/2/19)


Subjective


seen and examined at bedside. no acute overnight events. for lap 

cholecystectomy today





Objective





Last 24 Hour Vital Signs








  Date Time  Temp Pulse Resp B/P (MAP) Pulse Ox O2 Delivery O2 Flow Rate FiO2


 


11/5/19 15:25 97.3       


 


11/5/19 15:00  74 16 140/78 100 Nasal Cannula 3 


 


11/5/19 14:45  73 19 136/84 100 Nasal Cannula 3 


 


11/5/19 14:35  83 21 131/82 100 Nasal Cannula 3 


 


11/5/19 14:25  87 18 131/78 100 Simple Mask 6 


 


11/5/19 14:23  86 22  99   


 


11/5/19 14:20  96 24 129/77 100 Simple Mask 6 


 


11/5/19 14:17 99.4 112 24 136/78 100 Simple Mask 6 


 


11/5/19 12:00 98.2 95 22 138/91 (107) 98   


 


11/5/19 10:00      Room Air  


 


11/5/19 08:00 97.7 83 22 135/93 (107) 96   


 


11/5/19 04:00 97.5 79 18 122/72 (89) 99   


 


11/5/19 00:00 98.4 83 18 156/81 (106) 98   


 


11/4/19 21:00      Room Air  


 


11/4/19 20:00 98.2 95 20 118/73 (88) 97   


 


11/4/19 16:00 98.5 85 20 116/75 (89) 96   

















Intake and Output  


 


 11/4/19 11/5/19





 19:00 07:00


 


Intake Total  1000 ml


 


Balance  1000 ml


 


  


 


Intake IV Total  1000 ml


 


# Voids 2 3








Laboratory Tests


11/5/19 06:06: 


White Blood Count 7.2, Red Blood Count 4.33, Hemoglobin 12.5, Hematocrit 36.5L, 

Mean Corpuscular Volume 84, Mean Corpuscular Hemoglobin 28.9, Mean Corpuscular 

Hemoglobin Concent 34.3, Red Cell Distribution Width 10.9L, Platelet Count 251, 

Mean Platelet Volume 6.6, Neutrophils (%) (Auto) 66.2, Lymphocytes (%) (Auto) 

24.2, Monocytes (%) (Auto) 5.9, Eosinophils (%) (Auto) 3.0, Basophils (%) (Auto

) 0.7, Prothrombin Time 10.0, Prothromb Time International Ratio 0.9, Activated 

Partial Thromboplast Time 32, Sodium Level 141, Potassium Level 3.4L, Chloride 

Level 107, Carbon Dioxide Level 22, Anion Gap 12, Blood Urea Nitrogen 6L, 

Creatinine 0.4L, Estimat Glomerular Filtration Rate > 60, Glucose Level 77, 

Calcium Level 8.3L, Total Bilirubin 0.4, Aspartate Amino Transf (AST/SGOT) 31, 

Alanine Aminotransferase (ALT/SGPT) 33, Alkaline Phosphatase 62, Total Protein 

6.7, Albumin 2.7L, Globulin 4.0, Albumin/Globulin Ratio 0.7L, Amylase Level 28, 

Lipase 70L


Height (Feet):  5


Height (Inches):  0.00


Weight (Pounds):  228


Objective


General Appearance:  WD/WN, no apparent distress, alert, obese


EENT:  PERRL/EOMI, normal ENT inspection


Neck:  non-tender, normal alignment, supple


Cardiovascular:  normal peripheral pulses, normal rate, regular rhythm, no JVD


Respiratory/Chest:  chest wall non-tender, lungs clear, normal breath sounds


Abdomen:  normal bowel sounds, other - Right upper quadrant tenderness to 

palpation. +grimm's sign


Extremities:  normal range of motion, non-tender, normal inspection


Edema:  other - No lower extremity edema bilaterally


Neurologic:  CNs II-XII grossly normal, no motor/sensory deficits, alert, 

oriented x 3


Skin:  normal pigmentation, warm/dry











Kavian,Chun M.D. Nov 5, 2019 15:49

## 2019-11-05 NOTE — NUR
NURSE NOTES:

Received report from PEDRO Burch. Patient a/a/o x 4, breathing unlabored on room air without 
distress. Verbalized discomfort when needing to cough and burp. Provided pillow for support, 
will continue to monitor. IV noted on the left hand intact and patent. SCDs on and working 
properly. Bed placed at the lowest with brake, and siderails up for safety. Call light 
placed within reach. Will continue to monitor and provide care as ordered.

## 2019-11-06 VITALS — DIASTOLIC BLOOD PRESSURE: 91 MMHG | SYSTOLIC BLOOD PRESSURE: 128 MMHG

## 2019-11-06 VITALS — DIASTOLIC BLOOD PRESSURE: 93 MMHG | SYSTOLIC BLOOD PRESSURE: 134 MMHG

## 2019-11-06 VITALS — DIASTOLIC BLOOD PRESSURE: 73 MMHG | SYSTOLIC BLOOD PRESSURE: 118 MMHG

## 2019-11-06 VITALS — DIASTOLIC BLOOD PRESSURE: 69 MMHG | SYSTOLIC BLOOD PRESSURE: 115 MMHG

## 2019-11-06 VITALS — DIASTOLIC BLOOD PRESSURE: 88 MMHG | SYSTOLIC BLOOD PRESSURE: 130 MMHG

## 2019-11-06 VITALS — DIASTOLIC BLOOD PRESSURE: 78 MMHG | SYSTOLIC BLOOD PRESSURE: 124 MMHG

## 2019-11-06 RX ADMIN — SODIUM CHLORIDE SCH MLS/HR: 0.9 INJECTION INTRAVENOUS at 08:54

## 2019-11-06 RX ADMIN — DOCUSATE SODIUM SCH MG: 100 CAPSULE, LIQUID FILLED ORAL at 17:14

## 2019-11-06 RX ADMIN — HYDROCODONE BITARTRATE AND ACETAMINOPHEN PRN TAB: 10; 325 TABLET ORAL at 08:55

## 2019-11-06 RX ADMIN — DOCUSATE SODIUM SCH MG: 100 CAPSULE, LIQUID FILLED ORAL at 08:54

## 2019-11-06 RX ADMIN — HYDROCODONE BITARTRATE AND ACETAMINOPHEN PRN TAB: 10; 325 TABLET ORAL at 14:14

## 2019-11-06 NOTE — 48 HOUR POST ANESTHESIA EVAL
Post Anesthesia Evaluation


Procedure:  Laparoscopic cholecystectomy


Date of Evaluation:  Nov 6, 2019


Time of Evaluation:  14:51


Blood Pressure Systolic:  115


0:  69


Pulse Rate:  90


Respiratory Rate:  16


Temperature (Fahrenheit):  98.1


O2 Sat by Pulse Oximetry:  95


Airway:  patent


Nausea:  No


Vomiting:  No


Pain Intensity:  2


Hydration Status:  adequate


Cardiopulmonary Status:


Stable


Mental Status/LOC:  patient returned to baseline


Follow-up Care/Observations:


0


Post-Anesthesia Complications:


0


Follow-up care needed:  N/A











Rogerio Marti MD Nov 6, 2019 14:52

## 2019-11-06 NOTE — NUR
*-* INSURANCE *-*



ALL AVAILABLE CLINICALS HAVE BEEN FAXED TO:



[employer]

NCM:DAVY

P: [phone]

F: [phone]

## 2019-11-06 NOTE — NUR
CASE MANAGEMENT: REVIEW



44Y/FEMALE PRESENTED TO ED FROM HOME 





CC: EPIGASTRIC PAIN 10/10 X1 DAY 





SI: ACUTE CHOLECYSTITIS 

LAP AMIRAH 11/05 

T 97.5 HR 80 RR 18 /86 % ROOM AIR

US ABD -- CHOLELITHIASIS WITH STONES MEASURING UP TO 2.7 CM AND THE STONE 

LODGED IN THE GALLBLADDER NECK

HIDA SCAN -- CYSTIC DUCT OBSTRUCTION COMPATIBLE WITH CHOLECYSTITIS



 





IS: MORPHINE IV X1

PEPCID IV X1

ZOFRAN IV X1

TORADOL IV X1

D5 1/2 NS IVF BOLUS X1







***PATIENT ADMITTED TO MED/SURG UNIT 11/02/2019***

DCP: PATIENT IS FROM HOME

## 2019-11-06 NOTE — DISCHARGE SUMMARY
Discharge Summary


Hospital Course


Date of Admission


Nov 2, 2019 at 15:11


Date of Discharge


11/6/2019


Admitting Diagnosis


symptomatic cholelithiasis


JARED Sinclair is a 44 year old female who was admitted on Nov 2, 2019 at 15:11 for Symptomatic Cholelithiasis


Consultations


General surgery: Dr. Randall


Procedures


Laparoscopic cholecystectomy


Hospital Course


44-year-old female with a history of obesity presenting with symptomatic 

cholelithiasis.





#Acute cholecystitis


#Abdominal pain


> HIDA positive


-Admit to [facility]


-Appreciate general surgery recommendations. now s/p lap cholecystectomy 11/5/ 2019. POD #1 


-s/p Rocephin 1 g IV daily (11/2/19 -11/5 )


-s/p metronidazole 500 mg every 8 hours (11/2/19 -11/5 )


-blood cultures x 2


-Tylenol and morphine as needed for pain


-Zofran as needed for nausea


-s/p Sodium chloride at 100 cc/h


-Should arrange outpatient follow up with Dr. Randall 





#Chest pressure on 11/3/19.  Suspect related to anxiety. resolved 


-EKG, Troponin, negative 





#Obesity


-Counseled patient on weight loss





#Hypokalemia 


replaced prn 





FENPPX





DVTPPX: SCDs,


GI PPX: pepcid


Code status: Full





Dispo: Home after surgery





Today on exam she is awake and alert. abdomen, soft, non distended,  minimal 

incisional site pain. ext: no edema bilaterally. 








35 minutes spent on this encounter. Discussed with RN, Patient, and general 

surgery. > 50% spent on counseling and care coordination. 





Time of note may not reflect time patient was seen.





Discharge Medications


New Medications:  


Docusate Sodium* (Colace*) 100 Mg Capsule


100 MG ORAL TWICE A DAY for 5 Days, #10 CAP





Hydrocodone Bit/Acetaminophen 5-325* (Norco 5-325*) 1 Each Tablet


1 TAB ORAL Q4H PRN for 4 Days, #24 TAB











Discharge


Condition Upon Discharge:  stable


Discharge Disposition


Patient was discharged to home


Discharge Diagnoses:  


(1) Acute cholecystitis


(2) Symptomatic cholelithiasis


(3) Obesity











Chun Bhatti M.D. Nov 6, 2019 09:15

## 2019-11-06 NOTE — NUR
NURSE NOTES:

DR DIXON WAS AT BEDSIDE. PER MD, NO NEED FOR NEW IV ACCESS BECAUSE IV ANTIBIOTICS WILL BE 
STOPPED. 

-------------------------------------------------------------------------------

Addendum: 11/06/19 at 1154 by KRISTEN YOA RN RN

-------------------------------------------------------------------------------

PER DR DIXON, ORDER FOR DISCHARGE WILL BE PUT IN TODAY. PT MAY DECIDE TO LEAVE TODAY OR 
TOMORROW. PER PT, SHE WILL LEAVE TOMORROW.

## 2019-11-06 NOTE — DISCHARGE INSTRUCTIONS
Discharge Instructions


Discharge Instructions


Activity:  resume normal activities, as tolerated


Special Instructions


follow up with dr. Randall





For Congestive Heart Failure


Reminder


Report to your physician any weight gain of 5 pounds or more in one week.











Chun Bhatti M.D. Nov 6, 2019 11:56

## 2019-11-06 NOTE — NUR
NURSE NOTES:

PT AXOX4, CALM, RESTING IN BED. PT IN SITTING POSITION. PT STATES SHE HAS PAIN 7/10 OF 
ABDOMEN. PT STATES SHE DOES NOT WANT TO TAKE PAIN MEDICATION AT THIS TIME, WANTS TO TRY TO 
EAT BREAKFAST. PER PT, SHE THINKS THE PAIN MEDICATION MAY BE CAUSING NAUSEA. PT EDUCATED ON 
PRN ZOFRAN FOR N/V. PT VERBALIZED UNDERSTANDING AND WILL LET RN KNOW IF SHE WANTS TO TAKE 
ZOFRAN. LAPAROSCOPY SITES COVERED WITH GAUZE AND TEGADERM. MODERATE SERO-SANG DRAINAGE NOTED 
ON UMBILICAL SITE. IN NO APPARENT DISTRESS AT THIS TIME. CALL LIGHT WITHIN REACH. WILL 
CONTINUE TO MONITOR.

## 2019-11-06 NOTE — NUR
NURSE NOTES:

Received report from Nena Clay. Patient a/a/o x 4, breathing unlabored on room air without 
distress. No IV access, MD aware. Planned to be discharged tomorrow. Family member 
acknowledged at the bedside. No n/v noted, tolerating diet. Bed placed at the lowest with 
brake and siderails up for safety. Call light placed at the bedside. Will continue to 
monitor and provide care as ordered.

## 2019-11-07 VITALS — DIASTOLIC BLOOD PRESSURE: 90 MMHG | SYSTOLIC BLOOD PRESSURE: 125 MMHG

## 2019-11-07 VITALS — SYSTOLIC BLOOD PRESSURE: 124 MMHG | DIASTOLIC BLOOD PRESSURE: 75 MMHG

## 2019-11-07 VITALS — SYSTOLIC BLOOD PRESSURE: 121 MMHG | DIASTOLIC BLOOD PRESSURE: 61 MMHG

## 2019-11-07 VITALS — SYSTOLIC BLOOD PRESSURE: 117 MMHG | DIASTOLIC BLOOD PRESSURE: 79 MMHG

## 2019-11-07 RX ADMIN — DOCUSATE SODIUM SCH MG: 100 CAPSULE, LIQUID FILLED ORAL at 08:01

## 2019-11-07 NOTE — NUR
NURSE NOTES:

RN CLARIFIED WITH DR DIXON WHETHER SHE WANTED PT TO GO HOME WITH NORCO 5/325MG OR TYLENOL 
#3. PER DR DIXON, FOLLOW DR HUERTA'S PRESCRIPTIONS. RN EDUCATED PT ON PRESCRIPTIONS, 
DISCHARGE PACKET, AND S/S FOR EMERGENCY SERVICES. PT VERBALIZED UNDERSTANDING. PT HAD ACTIVE 
BOWEL SOUNDS AND HAD 2 BOWEL MOVEMENTS. PT VERBALIZED SHE HAS APPOINTMENT WITH DR HUERTA'S OFFICE ON FOLLOWING TUESDAY. BELONGINGS CHECKED AT BEDSIDE AND ALL ACCOUNTED. PT 
WAS DISCHARGED WITH FRIEND VIA PRIVATE VEHICLE.

## 2019-11-07 NOTE — NUR
NURSE NOTES:

Received report from Minsu  RN. AAO x 4 on room air. no acute distress. No IV access, MD 
aware. Planned to be discharged.  Call light within reach. bed is the lowest position. Will 
continue to provide plan of care

## 2019-11-07 NOTE — NUR
NURSE NOTES:

PT AXOX4, CALM, RESTING IN BED. PT TO BE DISCHARGED TODAY. PT STATES HER FRIEND WILL PICK 
HER UP AROUND 1:00PM. PT HAS APPOINTMENT SET UP WITH DR UHERTA FOR FOLLOW UP. IN NO 
APPARENT DISTRESS AT THIS TIME. WILL CONTINUE TO MONITOR.